# Patient Record
Sex: FEMALE | Race: WHITE | HISPANIC OR LATINO | Employment: FULL TIME | URBAN - METROPOLITAN AREA
[De-identification: names, ages, dates, MRNs, and addresses within clinical notes are randomized per-mention and may not be internally consistent; named-entity substitution may affect disease eponyms.]

---

## 2017-01-27 ENCOUNTER — OFFICE VISIT (OUTPATIENT)
Dept: OBSTETRICS AND GYNECOLOGY | Facility: CLINIC | Age: 37
End: 2017-01-27
Payer: COMMERCIAL

## 2017-01-27 VITALS
WEIGHT: 129.44 LBS | DIASTOLIC BLOOD PRESSURE: 82 MMHG | HEIGHT: 64 IN | BODY MASS INDEX: 22.1 KG/M2 | SYSTOLIC BLOOD PRESSURE: 116 MMHG

## 2017-01-27 DIAGNOSIS — Z12.4 SCREENING FOR MALIGNANT NEOPLASM OF THE CERVIX: ICD-10-CM

## 2017-01-27 DIAGNOSIS — Z01.419 ROUTINE GYNECOLOGICAL EXAMINATION: Primary | ICD-10-CM

## 2017-01-27 PROCEDURE — 99395 PREV VISIT EST AGE 18-39: CPT | Mod: S$GLB,,, | Performed by: OBSTETRICS & GYNECOLOGY

## 2017-01-27 PROCEDURE — 88175 CYTOPATH C/V AUTO FLUID REDO: CPT

## 2017-01-27 PROCEDURE — 99999 PR PBB SHADOW E&M-EST. PATIENT-LVL II: CPT | Mod: PBBFAC,,, | Performed by: OBSTETRICS & GYNECOLOGY

## 2017-01-27 NOTE — PROGRESS NOTES
"OBSTETRIC HISTORY:   OB History      Para Term  AB TAB SAB Ectopic Multiple Living    3 2 1 1 1     2         COMPREHENSIVE GYN HISTORY:  PAP History: Reports abnormal Paps. Date:. Treatment: Leep .  Infection History: Denies STDs. Denies PID.  Benign History: Denies uterine fibroids. Denies ovarian cysts. Denies endometriosis. Denies other conditions.  Cancer History: Denies cervical cancer. Denies uterine cancer or hyperplasia. Denies ovarian cancer. Denies vulvar cancer or pre-cancer. Denies vaginal cancer or precancer.  Denies breast cancer. Denies colon cancer.  Sexual Activity History: Reports being sexually active.  Menstrual History: Age of menarche: 9 years. Every 28 days, flows for days. Moderate flow.  Dysmenorrhea History: Reports severe dysmenorrhea.  Contraception History: Partner has had a vasectomy.  Had LSH      HPI:   36 y.o.  Patient's last menstrual period was 2015.   Patient is  here for her annual gynecologic exam.  She has no complaints. She denies bladder, breast and bowel complaints.    ROS:  GENERAL: Denies weight gain or weight loss. Feeling well overall.   SKIN: Denies rash or lesions.   HEAD: Denies headache.   NODES: Denies enlarged lymph nodes.   CHEST: Denies shortness of breath.   ABDOMEN: No abdominal pain, constipation, diarrhea, nausea, vomiting or rectal bleeding.   URINARY: No frequency, dysuria, hematuria, or burning on urination.  REPRODUCTIVE: See HPI.   BREASTS: The patient denies pain, lumps, or nipple discharge.   HEMATOLOGIC: No easy bruisability.   MUSCULOSKELETAL: Denies joint pain or back pain.   NEUROLOGIC: Denies weakness.   PSYCHIATRIC: Denies depression, anxiety or mood swings.    PE:   Visit Vitals    /82    Ht 5' 4" (1.626 m)    Wt 58.7 kg (129 lb 6.6 oz)    LMP 2015    BMI 22.21 kg/m2     APPEARANCE: Well nourished, well developed, in no acute distress.  NECK: Neck symmetric without  thyromegaly.  NODES: No inguinal, " cervical lymph node enlargement.  CHEST: Lungs clear to auscultation.  HEART: Regular rate and rhythm, no murmurs, rubs or gallops.  ABDOMEN: Soft. No tenderness or masses. No hernias.  BREASTS: Symmetrical, no skin changes or visible lesions. No palpable masses, nipple discharge or adenopathy bilaterally.  PELVIC:   VULVA: No lesions. Normal female genitalia.  URETHRAL MEATUS: Normal size and location, no lesions, no prolapse.  URETHRA: No masses, tenderness, prolapse or scarring.  VAGINA: Moist and well rugated, no discharge, no significant cystocele or rectocele.  CERVIX: No lesions and discharge.  UTERUS: SURGICALLY ABSENT.  ADNEXA: No masses or tenderness.    PROCEDURES:  Pap smear    Assessment:  Normal Gynecologic Exam    Plan:  Mammogram and Colonoscopy if indicated by current recommendations.  Return to clinic in one year or for any problems or complaints.    Counseling:  Patient was counseled today on A.C.S. Pap guidelines and recommendations for yearly pelvic exams and monthly self breast exams; to see her PCP for other health maintenance. Regular exercise and healthy diet.

## 2017-11-15 ENCOUNTER — PATIENT MESSAGE (OUTPATIENT)
Dept: OBSTETRICS AND GYNECOLOGY | Facility: CLINIC | Age: 37
End: 2017-11-15

## 2017-11-16 RX ORDER — CIPROFLOXACIN 500 MG/1
500 TABLET ORAL 2 TIMES DAILY
Qty: 6 TABLET | Refills: 0 | Status: SHIPPED | OUTPATIENT
Start: 2017-11-16 | End: 2017-11-19

## 2017-11-16 NOTE — TELEPHONE ENCOUNTER
Last routine GYN exam 1/27/17, pap WNL.    Please advise on mychart encounter:    Dr. Soto,   I have been having stabbing pains in my lower abdomen above my pubic bone. I have not experienced any burning while urinating or discharge,  but I did a dipstick urinalysis today at work and it showed small+ leukocytes. Last time I had a UTI, your office did not have appointments and I had to go to urgent care and it cost $75 to get a prescription. Can you send a prescription for Cipro to my pharmacy or do I have to make an appointment?     Thanks,   Leslye Witt

## 2018-02-08 ENCOUNTER — OFFICE VISIT (OUTPATIENT)
Dept: OBSTETRICS AND GYNECOLOGY | Facility: CLINIC | Age: 38
End: 2018-02-08
Payer: COMMERCIAL

## 2018-02-08 VITALS
WEIGHT: 133.63 LBS | SYSTOLIC BLOOD PRESSURE: 124 MMHG | DIASTOLIC BLOOD PRESSURE: 85 MMHG | HEIGHT: 64 IN | BODY MASS INDEX: 22.81 KG/M2

## 2018-02-08 DIAGNOSIS — Z12.31 ENCOUNTER FOR SCREENING MAMMOGRAM FOR MALIGNANT NEOPLASM OF BREAST: ICD-10-CM

## 2018-02-08 DIAGNOSIS — Z12.4 ROUTINE CERVICAL SMEAR: ICD-10-CM

## 2018-02-08 DIAGNOSIS — Z01.419 WELL WOMAN EXAM WITH ROUTINE GYNECOLOGICAL EXAM: Primary | ICD-10-CM

## 2018-02-08 PROCEDURE — 99999 PR PBB SHADOW E&M-EST. PATIENT-LVL III: CPT | Mod: PBBFAC,,, | Performed by: OBSTETRICS & GYNECOLOGY

## 2018-02-08 PROCEDURE — 88175 CYTOPATH C/V AUTO FLUID REDO: CPT

## 2018-02-08 PROCEDURE — 99395 PREV VISIT EST AGE 18-39: CPT | Mod: S$GLB,,, | Performed by: OBSTETRICS & GYNECOLOGY

## 2018-02-08 PROCEDURE — 99213 OFFICE O/P EST LOW 20 MIN: CPT | Mod: PO | Performed by: OBSTETRICS & GYNECOLOGY

## 2018-02-08 NOTE — PROGRESS NOTES
"OBSTETRIC HISTORY:   OB History      Para Term  AB Living    3 2 1 1 1 2    SAB TAB Ectopic Multiple Live Births            2         COMPREHENSIVE GYN HISTORY:  PAP History: Reports abnormal Paps. Date:. Treatment: Leep .  Infection History: Denies STDs. Denies PID.  Benign History: Denies uterine fibroids. Denies ovarian cysts. Denies endometriosis. Denies other conditions.  Cancer History: Denies cervical cancer. Denies uterine cancer or hyperplasia. Denies ovarian cancer. Denies vulvar cancer or pre-cancer. Denies vaginal cancer or precancer.  Denies breast cancer. Denies colon cancer.  Sexual Activity History: Reports being sexually active.  Menstrual History: Age of menarche: 9 years. Every 28 days, flows for days. Moderate flow.  Dysmenorrhea History: Reports severe dysmenorrhea.  Contraception History: Partner has had a vasectomy.  Had LSH      HPI:   37 y.o.  Patient's last menstrual period was 2015.   Patient is  here for her annual gynecologic exam.  She has no complaints but states occasional spotting and low back pain--had LSH so aware that this can happen.. She denies bladder, breast and bowel complaints.    ROS:  GENERAL: Denies weight gain or weight loss. Feeling well overall.   SKIN: Denies rash or lesions.   HEAD: Denies headache.   NODES: Denies enlarged lymph nodes.   CHEST: Denies shortness of breath.   ABDOMEN: No abdominal pain, constipation, diarrhea, nausea, vomiting or rectal bleeding.   URINARY: No frequency, dysuria, hematuria, or burning on urination.  REPRODUCTIVE: See HPI.   BREASTS: The patient denies pain, lumps, or nipple discharge.   HEMATOLOGIC: No easy bruisability.   MUSCULOSKELETAL: Denies joint pain or back pain.   NEUROLOGIC: Denies weakness.   PSYCHIATRIC: Denies depression, anxiety or mood swings.    PE:   /85   Ht 5' 4" (1.626 m)   Wt 60.6 kg (133 lb 9.6 oz)   LMP 2015   BMI 22.93 kg/m²   APPEARANCE: Well nourished, well developed, " in no acute distress.  NECK: Neck symmetric without  thyromegaly.  NODES: No inguinal, cervical lymph node enlargement.  CHEST: Lungs clear to auscultation.  HEART: Regular rate and rhythm, no murmurs, rubs or gallops.  ABDOMEN: Soft. No tenderness or masses. No hernias.  BREASTS: Symmetrical, no skin changes or visible lesions. No palpable masses, nipple discharge or adenopathy bilaterally.  PELVIC:   VULVA: No lesions. Normal female genitalia.  URETHRAL MEATUS: Normal size and location, no lesions, no prolapse.  URETHRA: No masses, tenderness, prolapse or scarring.  VAGINA: Moist and well rugated, no discharge, no significant cystocele or rectocele.  CERVIX: Normal Nabothian cysts and bleeding with obtaining Pap.  UTERUS: SURGICALLY ABSENT.  ADNEXA: No masses or tenderness.    PROCEDURES:  Pap smear    Assessment:  Normal Gynecologic Exam  Inflammatory Cervicitis--no treatment required    Plan:  Mammogram and Colonoscopy if indicated by current recommendations.  Return to clinic in one year or for any problems or complaints.    Counseling:  Patient was counseled today on A.C.S. Pap guidelines and recommendations for yearly pelvic exams and monthly self breast exams; to see her PCP for other health maintenance. Regular exercise and healthy diet.

## 2018-03-22 ENCOUNTER — OFFICE VISIT (OUTPATIENT)
Dept: OPTOMETRY | Facility: CLINIC | Age: 38
End: 2018-03-22
Payer: COMMERCIAL

## 2018-03-22 DIAGNOSIS — Z46.0 FITTING AND ADJUSTMENT OF SPECTACLES AND CONTACT LENSES: Primary | ICD-10-CM

## 2018-03-22 DIAGNOSIS — H52.13 MYOPIA OF BOTH EYES: Primary | ICD-10-CM

## 2018-03-22 DIAGNOSIS — H43.391 VITREOUS FLOATERS OF RIGHT EYE: ICD-10-CM

## 2018-03-22 PROCEDURE — 99999 PR PBB SHADOW E&M-EST. PATIENT-LVL II: CPT | Mod: PBBFAC,,, | Performed by: OPTOMETRIST

## 2018-03-22 PROCEDURE — 92310 CONTACT LENS FITTING OU: CPT | Mod: S$GLB,,, | Performed by: OPTOMETRIST

## 2018-03-22 PROCEDURE — 92004 COMPRE OPH EXAM NEW PT 1/>: CPT | Mod: S$GLB,,, | Performed by: OPTOMETRIST

## 2018-03-22 PROCEDURE — 92015 DETERMINE REFRACTIVE STATE: CPT | Mod: S$GLB,,, | Performed by: OPTOMETRIST

## 2018-03-22 NOTE — PROGRESS NOTES
HPI     Ms. Leslye Witt is here for her annual contact lens exam, she is   transferring care from an outside provider   Wears OASYS -2.00 Ou with no problems  Patient complains of having an increase in floaters in OD, states she has   had in the past when she had an eye virus    Patient request refraction     Contact Lens History   Lens Brand: Acuvue Oasis -2.00 OU, she is happy with comfort and vision   with lens   Pt replaces lens every 3-4 weeks   Wearing current lens for 1-2 weeks   Sleeps in lens: no     (-)drops  (-)flashes  (+)floaters  (-)diplopia    Diabetic no  No results found for: HGBA1C    OCULAR HISTORY  Last Eye Exam 1 year ago   (-)eye surgery   (+)pt could not specify what kind of virus, but report that she had a   reoccurring virus in 2012 affecting OD only     FAMILY HISTORY  (+)Glaucoma maternal grandmother       Last edited by Ari Moore, OD on 3/22/2018  7:51 AM. (History)        ROS     Negative for: Constitutional, Gastrointestinal, Neurological, Skin,   Genitourinary, Musculoskeletal, HENT, Endocrine, Cardiovascular, Eyes,   Respiratory, Psychiatric, Allergic/Imm, Heme/Lymph    Last edited by Ari Moore, OD on 3/22/2018  7:51 AM. (History)        Assessment /Plan     For exam results, see Encounter Report.    Myopia of both eyes    Vitreous floaters of right eye      1. Good fit/VA w OASYS CLs--pt happy  2. Discussed impending presby  3. Vitreous floaters--discussed Signs/Symptoms of Retinal Detachment.  Pt to rtc immediately if increased Floaters/Flashes occur    PLAN:    1. Wrote spex/CLRx  2. Continue Daily Wear schedule.  NO SLEEPING IN CONTACT LENSES. Clean and disinfect nightly.  exchange monthly.    3. rtc 1 yr

## 2018-04-05 ENCOUNTER — LAB VISIT (OUTPATIENT)
Dept: LAB | Facility: HOSPITAL | Age: 38
End: 2018-04-05
Attending: FAMILY MEDICINE
Payer: COMMERCIAL

## 2018-04-05 ENCOUNTER — OFFICE VISIT (OUTPATIENT)
Dept: FAMILY MEDICINE | Facility: CLINIC | Age: 38
End: 2018-04-05
Payer: COMMERCIAL

## 2018-04-05 VITALS
TEMPERATURE: 99 F | OXYGEN SATURATION: 100 % | WEIGHT: 125.88 LBS | HEART RATE: 76 BPM | BODY MASS INDEX: 20.97 KG/M2 | DIASTOLIC BLOOD PRESSURE: 60 MMHG | HEIGHT: 65 IN | SYSTOLIC BLOOD PRESSURE: 110 MMHG

## 2018-04-05 DIAGNOSIS — Z00.00 ANNUAL PHYSICAL EXAM: ICD-10-CM

## 2018-04-05 DIAGNOSIS — G89.29 CHRONIC RIGHT SHOULDER PAIN: ICD-10-CM

## 2018-04-05 DIAGNOSIS — M25.511 CHRONIC RIGHT SHOULDER PAIN: ICD-10-CM

## 2018-04-05 DIAGNOSIS — Z00.00 ANNUAL PHYSICAL EXAM: Primary | ICD-10-CM

## 2018-04-05 LAB
ALBUMIN SERPL BCP-MCNC: 4.3 G/DL
ALP SERPL-CCNC: 56 U/L
ALT SERPL W/O P-5'-P-CCNC: 22 U/L
ANION GAP SERPL CALC-SCNC: 6 MMOL/L
AST SERPL-CCNC: 24 U/L
BASOPHILS # BLD AUTO: 0.02 K/UL
BASOPHILS NFR BLD: 0.4 %
BILIRUB SERPL-MCNC: 0.7 MG/DL
BUN SERPL-MCNC: 14 MG/DL
CALCIUM SERPL-MCNC: 10 MG/DL
CHLORIDE SERPL-SCNC: 106 MMOL/L
CHOLEST SERPL-MCNC: 194 MG/DL
CHOLEST/HDLC SERPL: 2.3 {RATIO}
CO2 SERPL-SCNC: 28 MMOL/L
CREAT SERPL-MCNC: 0.8 MG/DL
DIFFERENTIAL METHOD: NORMAL
EOSINOPHIL # BLD AUTO: 0 K/UL
EOSINOPHIL NFR BLD: 0.9 %
ERYTHROCYTE [DISTWIDTH] IN BLOOD BY AUTOMATED COUNT: 12.9 %
EST. GFR  (AFRICAN AMERICAN): >60 ML/MIN/1.73 M^2
EST. GFR  (NON AFRICAN AMERICAN): >60 ML/MIN/1.73 M^2
ESTIMATED AVG GLUCOSE: 97 MG/DL
GLUCOSE SERPL-MCNC: 88 MG/DL
HBA1C MFR BLD HPLC: 5 %
HCT VFR BLD AUTO: 46 %
HDLC SERPL-MCNC: 85 MG/DL
HDLC SERPL: 43.8 %
HGB BLD-MCNC: 15 G/DL
LDLC SERPL CALC-MCNC: 96.6 MG/DL
LYMPHOCYTES # BLD AUTO: 1.2 K/UL
LYMPHOCYTES NFR BLD: 26.7 %
MCH RBC QN AUTO: 30.6 PG
MCHC RBC AUTO-ENTMCNC: 32.6 G/DL
MCV RBC AUTO: 94 FL
MONOCYTES # BLD AUTO: 0.4 K/UL
MONOCYTES NFR BLD: 8.2 %
NEUTROPHILS # BLD AUTO: 2.9 K/UL
NEUTROPHILS NFR BLD: 63.8 %
NONHDLC SERPL-MCNC: 109 MG/DL
PLATELET # BLD AUTO: 178 K/UL
PMV BLD AUTO: 11 FL
POTASSIUM SERPL-SCNC: 4.8 MMOL/L
PROT SERPL-MCNC: 7.3 G/DL
RBC # BLD AUTO: 4.9 M/UL
SODIUM SERPL-SCNC: 140 MMOL/L
TRIGL SERPL-MCNC: 62 MG/DL
TSH SERPL DL<=0.005 MIU/L-ACNC: 1.09 UIU/ML
URATE SERPL-MCNC: 4.5 MG/DL
WBC # BLD AUTO: 4.53 K/UL

## 2018-04-05 PROCEDURE — 99999 PR PBB SHADOW E&M-EST. PATIENT-LVL III: CPT | Mod: PBBFAC,,, | Performed by: FAMILY MEDICINE

## 2018-04-05 PROCEDURE — 80061 LIPID PANEL: CPT

## 2018-04-05 PROCEDURE — 36415 COLL VENOUS BLD VENIPUNCTURE: CPT | Mod: PO

## 2018-04-05 PROCEDURE — 84550 ASSAY OF BLOOD/URIC ACID: CPT

## 2018-04-05 PROCEDURE — 99385 PREV VISIT NEW AGE 18-39: CPT | Mod: S$GLB,,, | Performed by: FAMILY MEDICINE

## 2018-04-05 PROCEDURE — 83036 HEMOGLOBIN GLYCOSYLATED A1C: CPT

## 2018-04-05 PROCEDURE — 80053 COMPREHEN METABOLIC PANEL: CPT

## 2018-04-05 PROCEDURE — 84443 ASSAY THYROID STIM HORMONE: CPT

## 2018-04-05 PROCEDURE — 85025 COMPLETE CBC W/AUTO DIFF WBC: CPT

## 2018-04-05 NOTE — PROGRESS NOTES
"Chief Complaint   Patient presents with    [facility]     SUBJECTIVE:   37 y.o. female for annual routine checkup.  Current Outpatient Prescriptions   Medication Sig Dispense Refill    CALCIUM CARBONATE (CALCIUM 500 ORAL) Take 500 mg by mouth once daily.       cetirizine (ZYRTEC) 10 MG tablet Take 10 mg by mouth once daily.        multivitamin capsule Take 1 capsule by mouth once daily.         No current facility-administered medications for this visit.      Allergies: Patient has no known allergies.   Patient's last menstrual period was 09/03/2015.    ROS:  Feeling well. No dyspnea or chest pain on exertion.  No abdominal pain, change in bowel habits, black or bloody stools.  No urinary tract symptoms. GYN ROS: no breast pain or new or enlarging lumps on self exam, no vaginal bleeding. No neurological complaints.  Answers for HPI/ROS submitted by the patient on 4/5/2018   activity change: No  unexpected weight change: No  neck pain: No  hearing loss: No  rhinorrhea: No  trouble swallowing: No  eye discharge: No  visual disturbance: No  chest tightness: No  wheezing: No  chest pain: No  palpitations: No  blood in stool: No  constipation: No  vomiting: No  diarrhea: No  polydipsia: No  polyuria: No  difficulty urinating: No  hematuria: No  menstrual problem: No  dysuria: No  joint swelling: No  arthralgias: No  headaches: Yes  weakness: No  confusion: No  dysphoric mood: No    OBJECTIVE:   The patient appears well, alert, oriented x 3, in no distress.  /60   Pulse 76   Temp 99.3 °F (37.4 °C) (Oral)   Ht 5' 5" (1.651 m)   Wt 57.1 kg (125 lb 14.1 oz)   LMP 09/03/2015   SpO2 100%   BMI 20.95 kg/m²     Screening exam is normal  BREAST EXAM: deferred    PELVIC EXAM: deferred    ASSESSMENT:   1. Annual physical exam          PLAN:   Leslye was seen today for [facility].    Diagnoses and all orders for this visit:    Annual physical exam  -     CBC auto differential; Standing  -     Comprehensive " metabolic panel; Standing  -     Hemoglobin A1c; Standing  -     Lipid panel; Standing  -     TSH; Standing  -     Uric acid; Future  -     Urinalysis; Standing  Counseled on age appropriate medical preventative services, including age appropriate cancer screenings, over all nutritional health, need for a consistent exercise regimen and an over all push towards maintaining a vigorous and active lifestyle.  Counseled on age appropriate vaccines and discussed upcoming health care needs based on age/gender.  Spent time with patient counseling on need for a good patient/doctor relationship moving forward.  Discussed use of common OTC medications and supplements.  Discussed common dietary aids and use of caffeine and the need for good sleep hygiene and stress management.

## 2018-05-31 ENCOUNTER — HOSPITAL ENCOUNTER (OUTPATIENT)
Dept: RADIOLOGY | Facility: HOSPITAL | Age: 38
Discharge: HOME | End: 2018-05-31
Attending: OBSTETRICS & GYNECOLOGY
Payer: COMMERCIAL

## 2018-05-31 VITALS — BODY MASS INDEX: 20.83 KG/M2 | HEIGHT: 65 IN | WEIGHT: 125 LBS

## 2018-05-31 DIAGNOSIS — Z01.419 WELL WOMAN EXAM WITH ROUTINE GYNECOLOGICAL EXAM: ICD-10-CM

## 2018-05-31 DIAGNOSIS — Z12.31 ENCOUNTER FOR SCREENING MAMMOGRAM FOR MALIGNANT NEOPLASM OF BREAST: ICD-10-CM

## 2018-05-31 PROCEDURE — 77063 BREAST TOMOSYNTHESIS BI: CPT | Mod: 26,,, | Performed by: RADIOLOGY

## 2018-05-31 PROCEDURE — 77067 SCR MAMMO BI INCL CAD: CPT | Mod: 26,,, | Performed by: RADIOLOGY

## 2018-05-31 PROCEDURE — 77067 SCR MAMMO BI INCL CAD: CPT | Mod: TC

## 2018-08-28 ENCOUNTER — PATIENT MESSAGE (OUTPATIENT)
Dept: FAMILY MEDICINE | Facility: CLINIC | Age: 38
End: 2018-08-28

## 2018-08-28 ENCOUNTER — LAB VISIT (OUTPATIENT)
Dept: LAB | Facility: HOSPITAL | Age: 38
End: 2018-08-28
Attending: FAMILY MEDICINE
Payer: COMMERCIAL

## 2018-08-28 ENCOUNTER — OFFICE VISIT (OUTPATIENT)
Dept: FAMILY MEDICINE | Facility: CLINIC | Age: 38
End: 2018-08-28
Payer: COMMERCIAL

## 2018-08-28 VITALS
RESPIRATION RATE: 16 BRPM | DIASTOLIC BLOOD PRESSURE: 86 MMHG | WEIGHT: 138.25 LBS | BODY MASS INDEX: 23.03 KG/M2 | TEMPERATURE: 99 F | HEIGHT: 65 IN | SYSTOLIC BLOOD PRESSURE: 120 MMHG | OXYGEN SATURATION: 98 % | HEART RATE: 86 BPM

## 2018-08-28 DIAGNOSIS — T78.40XA ALLERGIC REACTION, INITIAL ENCOUNTER: ICD-10-CM

## 2018-08-28 DIAGNOSIS — T78.40XA ALLERGIC REACTION, INITIAL ENCOUNTER: Primary | ICD-10-CM

## 2018-08-28 PROCEDURE — 86003 ALLG SPEC IGE CRUDE XTRC EA: CPT | Mod: 59

## 2018-08-28 PROCEDURE — 86003 ALLG SPEC IGE CRUDE XTRC EA: CPT

## 2018-08-28 PROCEDURE — 36415 COLL VENOUS BLD VENIPUNCTURE: CPT | Mod: PO

## 2018-08-28 PROCEDURE — 96372 THER/PROPH/DIAG INJ SC/IM: CPT | Mod: S$GLB,,, | Performed by: PHYSICIAN ASSISTANT

## 2018-08-28 PROCEDURE — 99214 OFFICE O/P EST MOD 30 MIN: CPT | Mod: 25,S$GLB,, | Performed by: PHYSICIAN ASSISTANT

## 2018-08-28 PROCEDURE — 3008F BODY MASS INDEX DOCD: CPT | Mod: CPTII,S$GLB,, | Performed by: PHYSICIAN ASSISTANT

## 2018-08-28 PROCEDURE — 99999 PR PBB SHADOW E&M-EST. PATIENT-LVL V: CPT | Mod: PBBFAC,,, | Performed by: PHYSICIAN ASSISTANT

## 2018-08-28 RX ORDER — METHYLPREDNISOLONE ACETATE 80 MG/ML
80 INJECTION, SUSPENSION INTRA-ARTICULAR; INTRALESIONAL; INTRAMUSCULAR; SOFT TISSUE ONCE
Status: COMPLETED | OUTPATIENT
Start: 2018-08-28 | End: 2018-08-28

## 2018-08-28 RX ORDER — EPINEPHRINE 0.3 MG/.3ML
1 INJECTION SUBCUTANEOUS ONCE
Qty: 2 EACH | Refills: 0 | Status: SHIPPED | OUTPATIENT
Start: 2018-08-28 | End: 2018-08-28

## 2018-08-28 RX ORDER — PREDNISONE 20 MG/1
20 TABLET ORAL DAILY
Qty: 20 TABLET | Refills: 0 | Status: SHIPPED | OUTPATIENT
Start: 2018-08-28 | End: 2018-09-07

## 2018-08-28 RX ORDER — HYDROXYZINE HYDROCHLORIDE 25 MG/1
25 TABLET, FILM COATED ORAL NIGHTLY
Qty: 30 TABLET | Refills: 0 | Status: SHIPPED | OUTPATIENT
Start: 2018-08-28 | End: 2019-01-03

## 2018-08-28 RX ORDER — CHOLECALCIFEROL (VITAMIN D3) 25 MCG
1000 TABLET ORAL DAILY
COMMUNITY

## 2018-08-28 RX ADMIN — METHYLPREDNISOLONE ACETATE 80 MG: 80 INJECTION, SUSPENSION INTRA-ARTICULAR; INTRALESIONAL; INTRAMUSCULAR; SOFT TISSUE at 08:08

## 2018-08-28 NOTE — PROGRESS NOTES
Pt tolerated injection, given information sheet verbalized understanding and instructed to wait 15 minutes post injection.

## 2018-08-28 NOTE — PATIENT INSTRUCTIONS
General Allergic Reactions  An allergic reaction is a set of symptoms caused by an allergen. An allergen is something that causes a persons immune system to react. When a person comes in contact with an allergen, it causes the body to release chemicals. These include the chemical histamine. Histamine causes swelling and itching. It may affect the entire body. This is called a general allergic reaction. Often symptoms affect only 1 part of the body. This is called a local allergic reaction.  You are having an allergic reaction. Almost anything can cause one. Different people are allergic to different things. It is usually something that you ate or swallowed, came into contact with by getting or putting it on your skin or clothes, or something you breathed in the air. This can be very annoying and sometimes scary.  Most of us think of allergic reactions when we have a rash or itchy skin. Symptoms can include:  · Itching of the eyes, nose, and roof of the mouth  · Runny or stuffy nose  · Watery eyes   · Sneezing or coughing   · A blocked feeling in the ear  · Red, itchy rash called hives  · Red and purple spots  · Rash, redness, welts, blisters  · Itching, burning, stinging, pain  · Dry, flaky, cracking, scaly skin  Severe symptoms include:  · Swelling of the face, lips, or other parts of the body  · Hoarse voice  · Trouble swallowing, feeling like your throat is closing  · Trouble breathing, wheezing  · Nausea, vomiting, diarrhea, stomach cramps  · Feeling faint or lightheaded, rapid heart rate  Sometimes the cause may be obvious. But there are so many things that can cause a reaction that you may not be able to figure out. The most important things to help find your allergen are:  · Remembering when it started  · What you were doing at the time or just before that  · Any activities you were involved in  · Any new products or contacts  Below are some common causes. But remember that almost anything can cause a  reaction. You may not even be aware that you came into contact with one of these things:  · Dust, mold, pollen  · Plants (common ones are poison ivy and poison oak, but there are many others)   · Animals  · Foods such as shrimp, shellfish, peanuts, milk products, gluten, and eggs. Also food colorings, flavorings, and additives.  · Insect bites or stings such as bees, mosquitos, fleas, ticks  · Medicines such as penicillin, sulfa medicines, amoxicillin, aspirin, and ibuprofen. But any medicine can cause a reaction.  · Jewelry such as nickel or gold. This can be new, or something youve worn for a while, including zippers and buttons.  · Latex such as in gloves, clothes, toys, balloons, or some tapes. Some people allergic to latex may also have problems with foods like bananas, avocados, kiwi, papaya, or chestnuts.  · Lotions, perfumes, cosmetics, soaps, shampoos, skincare products, nail products  · Chemicals or dyes in clothing, linen, , hair dyes, soaps, iodine  Many viruses and common colds can cause a rash that is not an allergic reaction. Sometimes it is hard to tell the difference between allergies, sensitivity, or an intolerance to something. This is especially true with food. Many things can cause diarrhea, vomiting, stomach cramps, and skin irritation.  Home care    The goal of treatment is to help relieve the symptoms and get you feeling better. The rash will usually fade over several days. But it can sometimes last a couple of weeks. Over the next couple of days, there may be times when it is gets a little worse, and then better again. Here are some things to do:  · If you know what you are allergic to, stay away from it. Future reactions could be worse than this one.  · Avoid tight clothing and anything that heats up your skin (hot showers or baths, direct sunlight). Heat will make itching worse.  · An ice pack will relieve local areas of intense itching and redness. To make an ice pack, put ice  cubes in a plastic bag that seals at the top. Wrap it in a thin, clean towel. Dont put the ice directly on the skin because it can damage the skin.  · Oral diphenhydramine is an over-the-counter antihistamine sold at pharmacy and grocery stores. Unless a prescription antihistamine was given, diphenhydramine may be used to reduce itching if large areas of the skin are involved. It may make you sleepy. So be careful using it in the daytime or when going to school, working, or driving. Note: Dont use diphenhydramine if you have glaucoma or if you are a man with trouble urinating due to an enlarged prostate. There are other antihistamines that wont make you so sleepy. These are good choices for daytime use. Ask your pharmacist for suggestions.  · Dont use diphenhydramine cream on your skin. It can cause a further reaction in some people.  · To help prevent an infection, don't scratch the affected area. Scratching may worsen the reaction and damage your skin. It can also lead to an infection. Always check the affected for signs of an infection.  · Call your healthcare provider and ask what you can use to help decrease the itching.  · To decrease allergic reactions, try the following:    · Use heat-steam to clean your home  · Use high-efficiency particulate (HEPA) vacuums and filters  · Stay away from food and pet triggers  · Kill any cockroaches  · Clean your house often  Follow-up care  Follow up with your healthcare provider, or as advised. If you had a severe reaction today, or if you have had several mild to medium allergic reactions in the past, ask your provider about allergy testing. This can help you find out what you are allergic to. If your reaction included dizziness, fainting, or trouble breathing or swallowing, ask your provider about carrying auto-injectable epinephrine.  Call 911  Call 911 if any of these occur:  · Trouble breathing or swallowing, wheezing  · Cool, moist, pale skin  · Shortness of  breath  · Hoarse voice or trouble speaking  · Confused   · Very drowsy or trouble awakening  · Fainting or loss of consciousness  · Rapid heart rate  · Feeling of dizziness or weakness or a sudden drop in blood pressure  · Feeling of doom  · Feeling lightheaded  · Severe nausea or vomiting, or diarrhea  · Seizure  · Swelling in the face, eyelids, lips, mouth, throat or tongue  · Drooling  When to seek medical advice  Call your healthcare provider right away if any of these occur:  · Spreading areas of itching, redness or swelling  · Nausea or stomach cramps or abdominal pain  · Continuing or recurring symptoms  · Spreading areas of redness, swelling, or itching  · Signs of infection at the affected site:  ¨ Spreading redness  ¨ Increased pain or swelling  ¨ Fluid or colored drainage from the site  ¨ Fever of 100.4°F (38°C) or above lasting for 24 to 48 hours, or as directed by your provider  Date Last Reviewed: 3/1/2017  © 6621-5394 [employer]. [address] [place] [address], [place] [id number]. All rights reserved. This information is not intended as a substitute for professional medical care. Always follow your healthcare professional's instructions.      Food diary:  Eliminate almonds, eggs, and shellfish  Once resolved may add 1 item and give 3 days to react   [phone] Referrals

## 2018-08-28 NOTE — PROGRESS NOTES
Answers for HPI/ROS submitted by the patient on 8/28/2018   Rash  Chronicity: new  Onset: in the past 7 days  Progression since onset: gradually worsening  Affected locations: head, face, neck, chest, torso, back, abdomen, groin, left ear, left shoulder, left axilla, left arm, left elbow, left hand, left wrist, right ear, right shoulder, right axilla, right arm, right elbow, right hand, right wrist  Characteristics: burning, redness, itchiness  Exposed to: unknown  anorexia: No  congestion: No  cough: No  diarrhea: No  eye pain: No  facial edema: Yes  fatigue: Yes  fever: No  joint pain: No  nail changes: No  rhinorrhea: No  shortness of breath: No  sore throat: No  vomiting: No  Treatments tried: anti-itch cream, antihistamine, cold compress, topical steroids, oral steroids  Improvement on treatment: mild  asthma: No  allergies: Yes  eczema: No  varicella: Yes

## 2018-08-29 ENCOUNTER — LAB VISIT (OUTPATIENT)
Dept: LAB | Facility: HOSPITAL | Age: 38
End: 2018-08-29
Attending: PHYSICIAN ASSISTANT
Payer: COMMERCIAL

## 2018-08-29 DIAGNOSIS — L50.9 URTICARIA: Primary | ICD-10-CM

## 2018-08-29 DIAGNOSIS — L50.9 URTICARIA: ICD-10-CM

## 2018-08-29 LAB
ALMOND IGE QN: <0.35 KU/L
CRAB IGE QN: <0.35 KU/L
DEPRECATED ALMOND IGE RAST QL: NORMAL
DEPRECATED CRAB IGE RAST QL: NORMAL
DEPRECATED EGG WHITE IGE RAST QL: NORMAL
DEPRECATED EGG YOLK IGE RAST QL: NORMAL
EGG WHITE IGE QN: <0.35 KU/L
EGG YOLK IGE/IGE TOTAL %: <0.35 KU/L
T4 FREE SERPL-MCNC: 1.12 NG/DL
TSH SERPL DL<=0.005 MIU/L-ACNC: 0.58 UIU/ML

## 2018-08-29 PROCEDURE — 36415 COLL VENOUS BLD VENIPUNCTURE: CPT

## 2018-08-29 PROCEDURE — 86800 THYROGLOBULIN ANTIBODY: CPT

## 2018-08-29 PROCEDURE — 84443 ASSAY THYROID STIM HORMONE: CPT

## 2018-08-29 PROCEDURE — 84480 ASSAY TRIIODOTHYRONINE (T3): CPT

## 2018-08-29 PROCEDURE — 84439 ASSAY OF FREE THYROXINE: CPT

## 2018-08-29 PROCEDURE — 86376 MICROSOMAL ANTIBODY EACH: CPT

## 2018-08-29 PROCEDURE — 84445 ASSAY OF TSI GLOBULIN: CPT

## 2018-08-30 LAB
T3 SERPL-MCNC: 84 NG/DL
THYROPEROXIDASE IGG SERPL-ACNC: <6 IU/ML

## 2018-08-31 LAB
THRYOGLOBULIN INTERPRETATION: ABNORMAL
THYROGLOB AB SERPL-ACNC: <1.8 IU/ML
THYROGLOB SERPL-MCNC: 17 NG/ML
TSI SER-ACNC: <0.1 IU/L

## 2018-09-05 ENCOUNTER — PATIENT MESSAGE (OUTPATIENT)
Dept: FAMILY MEDICINE | Facility: CLINIC | Age: 38
End: 2018-09-05

## 2018-09-24 ENCOUNTER — HOSPITAL ENCOUNTER (OUTPATIENT)
Dept: RADIOLOGY | Facility: HOSPITAL | Age: 38
Discharge: HOME | End: 2018-09-24
Attending: STUDENT IN AN ORGANIZED HEALTH CARE EDUCATION/TRAINING PROGRAM
Payer: COMMERCIAL

## 2018-09-24 ENCOUNTER — OFFICE VISIT (OUTPATIENT)
Dept: ALLERGY | Facility: CLINIC | Age: 38
End: 2018-09-24
Payer: COMMERCIAL

## 2018-09-24 DIAGNOSIS — E04.9 ENLARGED THYROID GLAND: ICD-10-CM

## 2018-09-24 DIAGNOSIS — L50.9 URTICARIA: ICD-10-CM

## 2018-09-24 DIAGNOSIS — J31.0 RHINITIS, UNSPECIFIED TYPE: ICD-10-CM

## 2018-09-24 DIAGNOSIS — R89.9 ABNORMAL LABORATORY TEST RESULT: ICD-10-CM

## 2018-09-24 DIAGNOSIS — T78.3XXD ANGIOEDEMA, SUBSEQUENT ENCOUNTER: ICD-10-CM

## 2018-09-24 DIAGNOSIS — E04.9 ENLARGED THYROID GLAND: Primary | ICD-10-CM

## 2018-09-24 PROCEDURE — 76536 US EXAM OF HEAD AND NECK: CPT | Mod: 26,,, | Performed by: RADIOLOGY

## 2018-09-24 PROCEDURE — 76536 US EXAM OF HEAD AND NECK: CPT | Mod: TC

## 2018-09-24 PROCEDURE — 99999 PR PBB SHADOW E&M-EST. PATIENT-LVL III: CPT | Mod: PBBFAC,,, | Performed by: STUDENT IN AN ORGANIZED HEALTH CARE EDUCATION/TRAINING PROGRAM

## 2018-09-24 PROCEDURE — 99243 OFF/OP CNSLTJ NEW/EST LOW 30: CPT | Mod: S$GLB,,, | Performed by: STUDENT IN AN ORGANIZED HEALTH CARE EDUCATION/TRAINING PROGRAM

## 2018-09-24 NOTE — PATIENT INSTRUCTIONS
Testing  Ultrasound of thyrroid    Blood work for allergy testing and additional thyroid testing today    Keep a hive/swelling diary if Sx recur    Treatment    If hives or itching recur:  Zyrtec 10mg BID  Zantac 150 mg BID  Atarax at bedtime  OK to take 1-2 additional Zyrtec prn      Return after testing

## 2018-09-24 NOTE — LETTER
September 24, 2018      NANCY Guthrie  [address] [address]  [place] [address]           [facility]  [address]  [facility] [address]  Phone: [phone]  Fax: [phone]          Patient: Leslye Witt   MR Number: [id number]   YOB: 1980   Date of Visit: 9/24/2018       Dear Allison Munson:    Thank you for referring Leslye Witt to me for evaluation. Attached you will find relevant portions of my assessment and plan of care.    If you have questions, please do not hesitate to call me. I look forward to following Leslye Witt along with you.    Sincerely,    Sloane Bush MD    Enclosure  CC:  No Recipients    If you would like to receive this communication electronically, please contact [email or web address] or [phone] to request more information on [employer] access.    For providers and/or their staff who would like to refer a patient to [facility], please contact us through our one-stop-shop provider referral line, [facility] , at [phone].    If you feel you have received this communication in error or would no longer like to receive these types of communications, please e-mail [email or web address]

## 2018-09-24 NOTE — PROGRESS NOTES
Allergy Clinic Note  [facility]    Subjective:      Patient ID: Leslye Witt is a 38 y.o. female.    Chief Complaint: No chief complaint on file.      Referring Provider: Allison Munson    History of Present Illness:  38-year-old female referred from primary care for evaluation of suspected systemic allergic skin reaction in late August 2018, now resolved    She reports 2 completely different types of rashes.  Symptom onset was during a beach trip.  After spending the night in the beach house but before any sun exposure, she developed a rash on her neck which was itchy but resolved in a few hours.  The following day at returned on her neck and also on her chest with patches and itching described as like fire.  By Sunday night the rash was diffuse.  On Monday morning she started Medrol Dosepak prescribed by her PCP by phone.     By Monday afternoon the 2nd type of rash had started. She had itchy dots on her face which she described as connecting and welts.  These extended to include her arms, legs, scalp, and face.  She says she also felt throat symptom like pill stuck in throat.  She denies any shortness of breath, chest symptoms or GI symptoms. The following day she was seen at her PCPs office which she was given Celestone and a prednisone taper as well as EpiPen and H1 and H2 antihistamines.  She was also to told to eliminate eggs, dairy and shellfish.  Her symptoms recurred the following day with swelling of the ears lips and eyelids.  She was told that both her food allergy tests and her thyroid tests were normal.  At this point she had persistent itching but no hives.  She was able to reintroduce the foods and other products without difficulty.  Total duration of hives and swelling was about 48 hr.    In retrospect she noted that the house they were staying in had oxy clean which to which she had had a rash more than 15 years ago.  There was also a eucalyptus shower spray which was done  familiar.    Other allergic syndromes include seasonal rhinitis, primarily in late spring and late summer (including currently).  She was on Zyrtec at the outside of all symptoms.    Since the onset of symptoms she...  Denies fever, shakes, or chills  Denies change in weight, appetite, or energy level  Denies change in the texture of her hair, skin, or nails  Denies change in the appearance of her urine or stool  Denies vomiting, diarrhea, constipation, or abdominal pain  Denies abnormal bleeding  Denies any new aches or pains, specifically myalgias or arthralgias   Denies any unusual moles      Additional History:   Past medical history is significant for  an abnormal Pap smear status post LEEP.  She has no history of thyroid disease..  No Hx of ENT surgery. Family history is allergies or thyroid disease. Client   reports that  has never smoked. she has never used smokeless tobacco.  Exposures are notable for 2 dogs and staying at a beach house for the 1st few days of her rash.  No exposure to smokers, mold, or on unusual substances.  She works as a nurse at [employer]    Patient Active Problem List   Diagnosis    Personal history of cervical dysplasia    S/P laparoscopic hysterectomy    Chronic right shoulder pain, has rotator cuff tear     Current Outpatient Medications on File Prior to Visit   Medication Sig Dispense Refill    CALCIUM CARBONATE (CALCIUM 500 ORAL) Take 500 mg by mouth once daily.       cetirizine (ZYRTEC) 10 MG tablet Take 10 mg by mouth once daily.        EPINEPHrine (EPIPEN) 0.3 mg/0.3 mL AtIn Inject 0.3 mLs (0.3 mg total) into the muscle once. for 1 dose 2 each 0    hydrOXYzine HCl (ATARAX) 25 MG tablet Take 1 tablet (25 mg total) by mouth every evening. 30 tablet 0    multivitamin capsule Take 1 capsule by mouth once daily.        ranitidine (ZANTAC) 150 MG tablet Take 1 tablet (150 mg total) by mouth 2 (two) times daily. 60 tablet 11    vitamin D 1000 units Tab Take 1,000 Units  by mouth once daily.       No current facility-administered medications on file prior to visit.          Review of Systems   Constitutional: Negative for chills, fever and malaise/fatigue.   HENT: Negative for ear discharge.    Eyes: Negative for pain and discharge.   Respiratory: Negative for hemoptysis, shortness of breath, wheezing and stridor.    Cardiovascular: Negative for chest pain and palpitations.   Gastrointestinal: Negative for abdominal pain, blood in stool, nausea and vomiting.   Genitourinary: Negative for dysuria, flank pain and hematuria.   Musculoskeletal: Negative for joint pain and myalgias.   Skin: Positive for itching and rash.   Neurological: Negative for seizures and loss of consciousness.       Objective:   LMP 09/03/2015       Physical Exam   Constitutional: She is oriented to person, place, and time and well-developed, well-nourished, and in no distress. No distress.   HENT:   Right Ear: Tympanic membrane normal.   Left Ear: Tympanic membrane normal.   Nose: No nasal deformity. No epistaxis.  No foreign bodies.   Mouth/Throat: No uvula swelling. No oropharyngeal exudate or tonsillar abscesses.   TMs clear.  Nares pink without significant turbinates swelling   Eyes: Conjunctivae are normal. Pupils are equal, round, and reactive to light. Right eye exhibits no discharge. Left eye exhibits no discharge.   Neck: Neck supple. Thyromegaly present.   Symmetrically enlarged, firm thyroid without obvious nodularity.   Cardiovascular: Normal rate, regular rhythm, normal heart sounds and intact distal pulses.   Pulmonary/Chest: Breath sounds normal. No stridor. No respiratory distress. She has no wheezes. She has no rales.   Abdominal: Soft. She exhibits no mass. There is no tenderness.   Liver approximately 14 cm by scratch   Musculoskeletal: She exhibits no edema or deformity.   Lymphadenopathy:     She has no cervical adenopathy.   Neurological: She is alert and oriented to person, place, and  time. Gait normal.   Skin: Skin is warm and dry. No rash noted.   Psychiatric: Affect and judgment normal.       Data:   Labs from PCP (08/28/2018):  Thyroglobulin tumor marker 17 (High)  Thyroglobulin antibody screen less than 1.8  TSH 0.58  Total T3 84  Free T4 1.12  Thyroid peroxidase antibodies less than 6  Thyroid stimulating immunoglobulin quantitative less than 1.0    Food testing by the serum Immunocap method (08/28/2018 were negative for all allergens tested, specifically on meds, crab, egg white, egg yolk.    Assessment:     1. Enlarged thyroid gland    2. Abnormal laboratory test result    3. Urticaria    4. Rhinitis, unspecified type    5. Angioedema, subsequent encounter        Plan:       Medical decision making:  Client is presenting with an isolated episode of acute urticaria and angioedema (with minimal laryngeal symptoms). The 1st rash has a wide differential including contact and irritant dermatitis where as the 2nd rash which is clearly urticaria/angioedema. Symptoms have now resolved on daily low-dose histamine 1 blockers.       The best clue as to etiology is her thyromegaly, although she has no symptoms of hyper or hypothyroidism and has not normal TSH and free T4 within the last month.  The significance of her high thyroglobulin tumor marker is unknown.  Client also appears to be atopic with her seasonal rhinitis.          Diagnoses and all orders for this visit:    Enlarged thyroid gland  -     US Soft Tissue Head Neck Thyroid; Future    Abnormal laboratory test result  -     US Soft Tissue Head Neck Thyroid; Future    Urticaria  -     Anti-thyroglobulin Antibody Level; Future  -     Antimicrosomal Antibody Level; Future  -     Anti-IgE Receptor Antibody Level; Future  -     Serum Tryptase; Future    Rhinitis, unspecified type  -     IgE; Future  -     Dermatophagoides [place]; Future  -     Dermatophagoides Pteronyssinus; Future  -     Bermuda; Future  -     Francesco; Future  -     Glascock;  Future  -     English Plantain; Future  -     Oak Pecan; Future  -     Pecan; Future  -     Marsh Elder; Future  -     Ragweed; Future  -     Alternaria; Future  -     Aspergillus; Future  -     Cat; Future  -     Cockroach; Future  -     Dog; Future    Angioedema, subsequent encounter        Patient Instructions   Testing  Ultrasound of thyrroid    Blood work for allergy testing and additional thyroid testing today    Keep a hive/swelling diary if Sx recur    Treatment    If hives or itching recur:  Zyrtec 10mg BID  Zantac 150 mg BID  Atarax at bedtime  OK to take 1-2 additional Zyrtec prn      Return after testing          Follow-up in about 2 weeks (around 10/8/2018).    Sloane Bush MD

## 2018-10-01 ENCOUNTER — OFFICE VISIT (OUTPATIENT)
Dept: ALLERGY | Facility: CLINIC | Age: 38
End: 2018-10-01
Payer: COMMERCIAL

## 2018-10-01 VITALS
HEIGHT: 64 IN | DIASTOLIC BLOOD PRESSURE: 76 MMHG | BODY MASS INDEX: 22.43 KG/M2 | WEIGHT: 131.38 LBS | SYSTOLIC BLOOD PRESSURE: 108 MMHG

## 2018-10-01 DIAGNOSIS — Z87.898 HISTORY OF ANGIOEDEMA: ICD-10-CM

## 2018-10-01 DIAGNOSIS — Z87.2 HX OF URTICARIA: Primary | ICD-10-CM

## 2018-10-01 PROCEDURE — 99999 PR PBB SHADOW E&M-EST. PATIENT-LVL III: CPT | Mod: PBBFAC,,, | Performed by: STUDENT IN AN ORGANIZED HEALTH CARE EDUCATION/TRAINING PROGRAM

## 2018-10-01 PROCEDURE — 99213 OFFICE O/P EST LOW 20 MIN: CPT | Mod: S$GLB,,, | Performed by: STUDENT IN AN ORGANIZED HEALTH CARE EDUCATION/TRAINING PROGRAM

## 2018-10-01 PROCEDURE — 3008F BODY MASS INDEX DOCD: CPT | Mod: CPTII,S$GLB,, | Performed by: STUDENT IN AN ORGANIZED HEALTH CARE EDUCATION/TRAINING PROGRAM

## 2018-10-01 RX ORDER — EPINEPHRINE 0.3 MG/.3ML
INJECTION INTRAMUSCULAR
Refills: 0 | COMMUNITY
Start: 2018-08-28

## 2018-10-01 NOTE — PROGRESS NOTES
Allergy Clinic Note  [facility]    Subjective:          Chief Complaint: Follow-up (review labs)      Allergy problem list  Two types       Blotchy rash on neck and upper chest      Acute Urticaria / angioedema - resolved               With possible laryngeal edema  Spring/summer rhinitis  Enlarged thyroid    History of Present Illness: Leslye Witt is a 38 y.o. female with acute urticaria/angioedema who returns at my request to follow up symptoms and test results.    At last visit, she was given instructions to take H1 and H2 antihistamines if hive symptoms recurred.    Today she reports no recurrence of urticaria, angioedema or other rashes.  She does report mild itching under both eyes for the last few days. There is no associated rash.  No clear precipitants.    Related medications  EpiPen   If hives recur:   Zyrtec 10 mg twice a day   Zantac 150 mg twice a day   Atarax 25 mg every evening       No new problems or complaints.    Additional History:   Interval Hx is unremarkable.  Client is a lifetime nonsmoker.  Past medical, family, and social histories are unchanged.       Patient Active Problem List   Diagnosis    Personal history of cervical dysplasia    S/P laparoscopic hysterectomy    Chronic right shoulder pain, has rotator cuff tear     Current Outpatient Medications on File Prior to Visit   Medication Sig Dispense Refill    CALCIUM CARBONATE (CALCIUM 500 ORAL) Take 500 mg by mouth once daily.       cetirizine (ZYRTEC) 10 MG tablet Take 10 mg by mouth once daily.        multivitamin capsule Take 1 capsule by mouth once daily.        ranitidine (ZANTAC) 150 MG tablet Take 1 tablet (150 mg total) by mouth 2 (two) times daily. 60 tablet 11    vitamin D 1000 units Tab Take 1,000 Units by mouth once daily.      EPIPEN 2-EWA 0.3 mg/0.3 mL AtIn   0    hydrOXYzine HCl (ATARAX) 25 MG tablet Take 1 tablet (25 mg total) by mouth every evening. 30 tablet 0     No current  "facility-administered medications on file prior to visit.          Review of Systems   Constitutional: Negative for chills and fever.   HENT: Negative for ear discharge and nosebleeds.    Eyes: Negative for discharge and redness.   Respiratory: Negative for hemoptysis, sputum production and stridor.    Gastrointestinal: Negative for blood in stool, melena and vomiting.   Genitourinary: Negative for dysuria and hematuria.   Skin: Negative for itching and rash.   Neurological: Negative for seizures and loss of consciousness.       Objective:   /76 (BP Location: Left arm, Patient Position: Sitting, BP Method: Medium (Manual))   Ht 5' 4" (1.626 m)   Wt 59.6 kg (131 lb 6.3 oz)   LMP 09/03/2015   BMI 22.55 kg/m²       Physical Exam   Constitutional: She is oriented to person, place, and time and well-developed, well-nourished, and in no distress.   HENT:   Head: Normocephalic and atraumatic.   Nose: Nose normal.   Eyes: Conjunctivae are normal. No scleral icterus.   Neck: Neck supple.   Cardiovascular: Normal rate, regular rhythm and intact distal pulses.   Pulmonary/Chest: Effort normal. No stridor. No respiratory distress.   Abdominal: She exhibits no distension.   Musculoskeletal: She exhibits no edema or deformity.   Neurological: She is alert and oriented to person, place, and time.   Skin: No rash noted. No erythema.   Psychiatric: Affect and judgment normal.       Data:   Aeroallergen testing by the serum Immunocap method (09/24/2018) was entirely negative  Total serum IgE < 35    Labs (09/24/2018)   Normal  thyroid autoantibodies      (specifically thyroglobulin antibody screen and thyroid peroxidase antibodies)  Baseline serum tryptase normal    Ultrasound of neck normal.  Thyroid not considered enlarged.  No abnormal nodes or mass seen.    Assessment:     1. Hx of urticaria    2. History of angioedema        Plan:     Medical Decision Making:  Client does not, in fact, have any thyromegaly as measured " by ultrasound.  She is also euthyroid, both clinically and chemically.    Hx of acute urticaria /  Angioedema - resolved  Results discussed and copy given  Return if hives recur    Patient Instructions   If hives recur:   Zyrtec 10 mg twice a day   Zantac 150 mg twice a day   Atarax 25 mg every evening     Sx diary    And return here.      Follow-up if symptoms recur.    Sloane Bush MD

## 2018-10-01 NOTE — PATIENT INSTRUCTIONS
If hives recur:   Zyrtec 10 mg twice a day   Zantac 150 mg twice a day   Atarax 25 mg every evening     Sx diary    And return here.

## 2018-10-02 VITALS — BODY MASS INDEX: 21.94 KG/M2 | HEIGHT: 64 IN | WEIGHT: 128.5 LBS

## 2019-01-03 ENCOUNTER — OFFICE VISIT (OUTPATIENT)
Dept: FAMILY MEDICINE | Facility: CLINIC | Age: 39
End: 2019-01-03
Payer: COMMERCIAL

## 2019-01-03 VITALS
DIASTOLIC BLOOD PRESSURE: 88 MMHG | TEMPERATURE: 99 F | OXYGEN SATURATION: 99 % | HEIGHT: 64 IN | BODY MASS INDEX: 22.92 KG/M2 | HEART RATE: 103 BPM | WEIGHT: 134.25 LBS | SYSTOLIC BLOOD PRESSURE: 126 MMHG

## 2019-01-03 DIAGNOSIS — J20.9 ACUTE BRONCHITIS, UNSPECIFIED ORGANISM: Primary | ICD-10-CM

## 2019-01-03 PROCEDURE — 99999 PR PBB SHADOW E&M-EST. PATIENT-LVL IV: ICD-10-PCS | Mod: PBBFAC,,, | Performed by: PHYSICIAN ASSISTANT

## 2019-01-03 PROCEDURE — 3008F BODY MASS INDEX DOCD: CPT | Mod: CPTII,S$GLB,, | Performed by: PHYSICIAN ASSISTANT

## 2019-01-03 PROCEDURE — 99214 PR OFFICE/OUTPT VISIT, EST, LEVL IV, 30-39 MIN: ICD-10-PCS | Mod: S$GLB,,, | Performed by: PHYSICIAN ASSISTANT

## 2019-01-03 PROCEDURE — 99999 PR PBB SHADOW E&M-EST. PATIENT-LVL IV: CPT | Mod: PBBFAC,,, | Performed by: PHYSICIAN ASSISTANT

## 2019-01-03 PROCEDURE — 99214 OFFICE O/P EST MOD 30 MIN: CPT | Mod: S$GLB,,, | Performed by: PHYSICIAN ASSISTANT

## 2019-01-03 PROCEDURE — 3008F PR BODY MASS INDEX (BMI) DOCUMENTED: ICD-10-PCS | Mod: CPTII,S$GLB,, | Performed by: PHYSICIAN ASSISTANT

## 2019-01-03 RX ORDER — PROMETHAZINE HYDROCHLORIDE AND DEXTROMETHORPHAN HYDROBROMIDE 6.25; 15 MG/5ML; MG/5ML
5 SYRUP ORAL 4 TIMES DAILY PRN
Qty: 120 ML | Refills: 0 | Status: SHIPPED | OUTPATIENT
Start: 2019-01-03 | End: 2019-01-10

## 2019-01-03 RX ORDER — FLUTICASONE PROPIONATE 50 MCG
1 SPRAY, SUSPENSION (ML) NASAL 2 TIMES DAILY
Qty: 16 G | Refills: 12 | Status: SHIPPED | OUTPATIENT
Start: 2019-01-03 | End: 2019-02-02

## 2019-01-03 RX ORDER — AZITHROMYCIN 250 MG/1
TABLET, FILM COATED ORAL
Qty: 6 TABLET | Refills: 0 | Status: SHIPPED | OUTPATIENT
Start: 2019-01-03 | End: 2019-01-08

## 2019-01-03 RX ORDER — ALBUTEROL SULFATE 90 UG/1
2 AEROSOL, METERED RESPIRATORY (INHALATION) EVERY 6 HOURS PRN
Qty: 1 EACH | Refills: 0 | Status: SHIPPED | OUTPATIENT
Start: 2019-01-03 | End: 2021-01-15

## 2019-01-03 RX ORDER — DESLORATADINE 5 MG/1
5 TABLET ORAL DAILY
Qty: 30 TABLET | Refills: 11 | Status: SHIPPED | OUTPATIENT
Start: 2019-01-03 | End: 2021-01-15

## 2019-01-03 NOTE — PROGRESS NOTES
Subjective:       Patient ID: Leslye iWtt is a 38 y.o. female with multiple medical diagnoses as listed in the medical history and problem list that presents for Cough  .    Chief Complaint: Cough      Cough   This is a new problem. The current episode started 1 to 4 weeks ago (tues 12/25). The problem has been gradually worsening. The problem occurs every few minutes. The cough is productive of sputum. Associated symptoms include chills, ear congestion, ear pain (pressure ), myalgias, nasal congestion and a sore throat. Pertinent negatives include no chest pain, eye redness, fever, headaches, heartburn, hemoptysis, postnasal drip (tickle ), rash, rhinorrhea, shortness of breath, sweats, weight loss or wheezing. Nothing aggravates the symptoms. She has tried OTC cough suppressant, body position changes and rest (mucinex DM, robutussin nighttime, halls ) for the symptoms. The treatment provided mild relief. Her past medical history is significant for environmental allergies. There is no history of asthma, bronchiectasis, bronchitis, COPD, emphysema or pneumonia.     Review of Systems   Constitutional: Positive for chills and fatigue. Negative for fever and weight loss.   HENT: Positive for ear pain (pressure ), sore throat and voice change (saturday ). Negative for congestion, postnasal drip (tickle ), rhinorrhea, sinus pressure, sinus pain and sneezing.    Eyes: Negative for pain, discharge, redness and itching.   Respiratory: Positive for cough (dry ) and chest tightness. Negative for hemoptysis, shortness of breath and wheezing.         Congestion in chest    Cardiovascular: Negative for chest pain.   Gastrointestinal: Negative for abdominal pain, constipation, diarrhea, heartburn, nausea and vomiting.   Musculoskeletal: Positive for myalgias.   Skin: Negative for rash.   Allergic/Immunologic: Positive for environmental allergies.   Neurological: Negative for headaches.         PAST MEDICAL HISTORY:  Past  Medical History:   Diagnosis Date    Abnormal Pap smear of vagina 1998    Allergy     GERD (gastroesophageal reflux disease)        SOCIAL HISTORY:  Social History     Socioeconomic History    Marital status:      Spouse name: Not on file    Number of children: Not on file    Years of education: Not on file    Highest education level: Not on file   Social Needs    Financial resource strain: Not on file    Food insecurity - worry: Not on file    Food insecurity - inability: Not on file    Transportation needs - medical: Not on file    Transportation needs - non-medical: Not on file   Occupational History    Not on file   Tobacco Use    Smoking status: Never Smoker    Smokeless tobacco: Never Used   Substance and Sexual Activity    Alcohol use: Yes     Alcohol/week: 1.5 oz     Types: 3 Standard drinks or equivalent per week    Drug use: No    Sexual activity: Yes     Partners: Male     Birth control/protection: Other-see comments     Comment:  had a vasectomy   Other Topics Concern    Not on file   Social History Narrative    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    Travel: [place] [employer]       ALLERGIES AND MEDICATIONS: updated and reviewed.  Review of patient's allergies indicates:  No Known Allergies  Current Outpatient Medications   Medication Sig Dispense Refill    CALCIUM CARBONATE (CALCIUM 500 ORAL) Take 500 mg by mouth once daily.       EPIPEN 2-EWA 0.3 mg/0.3 mL AtIn   0    multivitamin capsule Take 1 capsule by mouth once daily.        vitamin D 1000 units Tab Take 1,000 Units by mouth once daily.      albuterol (PROVENTIL/VENTOLIN HFA) 90 mcg/actuation inhaler Inhale 2 puffs into the lungs every 6 (six) hours as needed for Wheezing. 1 each 0    azithromycin (Z-EWA) 250 MG tablet Take 2 tablets by mouth on day 1; Take 1 tablet by mouth on days 2-5 6 tablet 0    desloratadine (CLARINEX) 5 mg tablet Take 1 tablet (5 mg total) by mouth once daily. 30 tablet  "11    fluticasone (FLONASE) 50 mcg/actuation nasal spray 1 spray (50 mcg total) by Each Nare route 2 (two) times daily. 16 g 12    promethazine-dextromethorphan (PROMETHAZINE-DM) 6.25-15 mg/5 mL Syrp Take 5 mLs by mouth 4 (four) times daily as needed. 120 mL 0     No current facility-administered medications for this visit.          Objective:   /88   Pulse 103   Temp 98.8 °F (37.1 °C) (Oral)   Ht 5' 4" (1.626 m)   Wt 60.9 kg (134 lb 4.2 oz)   LMP 09/03/2015   SpO2 99%   BMI 23.05 kg/m²      Physical Exam   Constitutional: She is oriented to person, place, and time. No distress.   HENT:   Head: Normocephalic and atraumatic.   Right Ear: Tympanic membrane, external ear and ear canal normal.   Left Ear: Tympanic membrane, external ear and ear canal normal.   Nose: No rhinorrhea. Right sinus exhibits no maxillary sinus tenderness and no frontal sinus tenderness. Left sinus exhibits no maxillary sinus tenderness and no frontal sinus tenderness.   Mouth/Throat: Uvula is midline, oropharynx is clear and moist and mucous membranes are normal. No oropharyngeal exudate.   Right nasal congestion with clear discharge    Eyes: Conjunctivae are normal.   Cardiovascular: Normal rate and regular rhythm.   Pulmonary/Chest: Effort normal. She has no decreased breath sounds. She has no wheezes. She has no rhonchi. She has no rales.   Negative egophony    Lymphadenopathy:     She has no cervical adenopathy.   Neurological: She is alert and oriented to person, place, and time.           Assessment:       1. Acute bronchitis, unspecified organism        Plan:       Acute bronchitis, unspecified organism  -     desloratadine (CLARINEX) 5 mg tablet; Take 1 tablet (5 mg total) by mouth once daily.  Dispense: 30 tablet; Refill: 11  -     albuterol (PROVENTIL/VENTOLIN HFA) 90 mcg/actuation inhaler; Inhale 2 puffs into the lungs every 6 (six) hours as needed for Wheezing.  Dispense: 1 each; Refill: 0  -     azithromycin (Z-EWA) " 250 MG tablet; Take 2 tablets by mouth on day 1; Take 1 tablet by mouth on days 2-5  Dispense: 6 tablet; Refill: 0    Other orders  -     fluticasone (FLONASE) 50 mcg/actuation nasal spray; 1 spray (50 mcg total) by Each Nare route 2 (two) times daily.  Dispense: 16 g; Refill: 12  -     promethazine-dextromethorphan (PROMETHAZINE-DM) 6.25-15 mg/5 mL Syrp; Take 5 mLs by mouth 4 (four) times daily as needed.  Dispense: 120 mL; Refill: 0            No Follow-up on file.

## 2019-01-03 NOTE — PROGRESS NOTES
Answers for HPI/ROS submitted by the patient on 1/2/2019   Cough  Chronicity: new  Onset: in the past 7 days  Progression since onset: gradually worsening  Frequency: every few minutes  Cough characteristics: productive of sputum  chest pain: No  chills: No  ear congestion: Yes  ear pain: Yes  fever: No  headaches: No  heartburn: No  hemoptysis: No  myalgias: Yes  nasal congestion: Yes  postnasal drip: No  rash: No  rhinorrhea: No  shortness of breath: No  sore throat: Yes  sweats: No  weight loss: No  wheezing: No  Aggravated by: nothing  asthma: No  bronchiectasis: No  bronchitis: No  COPD: No  emphysema: No  environmental allergies: Yes  pneumonia: No  Treatments tried: OTC cough suppressant, body position changes, rest  Improvement on treatment: mild

## 2019-05-24 ENCOUNTER — PATIENT OUTREACH (OUTPATIENT)
Dept: ADMINISTRATIVE | Facility: HOSPITAL | Age: 39
End: 2019-05-24

## 2019-06-07 ENCOUNTER — OFFICE VISIT (OUTPATIENT)
Dept: FAMILY MEDICINE | Facility: CLINIC | Age: 39
End: 2019-06-07
Payer: COMMERCIAL

## 2019-06-07 ENCOUNTER — LAB VISIT (OUTPATIENT)
Dept: LAB | Facility: HOSPITAL | Age: 39
End: 2019-06-07
Attending: FAMILY MEDICINE
Payer: COMMERCIAL

## 2019-06-07 VITALS
OXYGEN SATURATION: 98 % | TEMPERATURE: 99 F | WEIGHT: 131.38 LBS | BODY MASS INDEX: 22.43 KG/M2 | HEIGHT: 64 IN | HEART RATE: 78 BPM | RESPIRATION RATE: 15 BRPM | DIASTOLIC BLOOD PRESSURE: 82 MMHG | SYSTOLIC BLOOD PRESSURE: 126 MMHG

## 2019-06-07 DIAGNOSIS — M25.511 CHRONIC RIGHT SHOULDER PAIN: ICD-10-CM

## 2019-06-07 DIAGNOSIS — Z00.00 ANNUAL PHYSICAL EXAM: Primary | ICD-10-CM

## 2019-06-07 DIAGNOSIS — Z23 NEED FOR TDAP VACCINATION: ICD-10-CM

## 2019-06-07 DIAGNOSIS — G89.29 CHRONIC RIGHT SHOULDER PAIN: ICD-10-CM

## 2019-06-07 DIAGNOSIS — Z00.00 ANNUAL PHYSICAL EXAM: ICD-10-CM

## 2019-06-07 LAB
ALBUMIN SERPL BCP-MCNC: 4.4 G/DL (ref 3.5–5.2)
ALP SERPL-CCNC: 58 U/L (ref 55–135)
ALT SERPL W/O P-5'-P-CCNC: 28 U/L (ref 10–44)
ANION GAP SERPL CALC-SCNC: 8 MMOL/L (ref 8–16)
AST SERPL-CCNC: 26 U/L (ref 10–40)
BASOPHILS # BLD AUTO: 0.02 K/UL (ref 0–0.2)
BASOPHILS NFR BLD: 0.4 % (ref 0–1.9)
BILIRUB SERPL-MCNC: 0.7 MG/DL (ref 0.1–1)
BUN SERPL-MCNC: 10 MG/DL (ref 6–20)
CALCIUM SERPL-MCNC: 10.1 MG/DL (ref 8.7–10.5)
CHLORIDE SERPL-SCNC: 103 MMOL/L (ref 95–110)
CHOLEST SERPL-MCNC: 197 MG/DL (ref 120–199)
CHOLEST/HDLC SERPL: 2.1 {RATIO} (ref 2–5)
CO2 SERPL-SCNC: 29 MMOL/L (ref 23–29)
CREAT SERPL-MCNC: 0.8 MG/DL (ref 0.5–1.4)
DIFFERENTIAL METHOD: ABNORMAL
EOSINOPHIL # BLD AUTO: 0.1 K/UL (ref 0–0.5)
EOSINOPHIL NFR BLD: 1.1 % (ref 0–8)
ERYTHROCYTE [DISTWIDTH] IN BLOOD BY AUTOMATED COUNT: 13.7 % (ref 11.5–14.5)
EST. GFR  (AFRICAN AMERICAN): >60 ML/MIN/1.73 M^2
EST. GFR  (NON AFRICAN AMERICAN): >60 ML/MIN/1.73 M^2
ESTIMATED AVG GLUCOSE: 94 MG/DL (ref 68–131)
GLUCOSE SERPL-MCNC: 88 MG/DL (ref 70–110)
HBA1C MFR BLD HPLC: 4.9 % (ref 4–5.6)
HCT VFR BLD AUTO: 45 % (ref 37–48.5)
HDLC SERPL-MCNC: 95 MG/DL (ref 40–75)
HDLC SERPL: 48.2 % (ref 20–50)
HGB BLD-MCNC: 14.6 G/DL (ref 12–16)
LDLC SERPL CALC-MCNC: 87 MG/DL (ref 63–159)
LYMPHOCYTES # BLD AUTO: 1.1 K/UL (ref 1–4.8)
LYMPHOCYTES NFR BLD: 23.2 % (ref 18–48)
MCH RBC QN AUTO: 31.2 PG (ref 27–31)
MCHC RBC AUTO-ENTMCNC: 32.4 G/DL (ref 32–36)
MCV RBC AUTO: 96 FL (ref 82–98)
MONOCYTES # BLD AUTO: 0.5 K/UL (ref 0.3–1)
MONOCYTES NFR BLD: 10.2 % (ref 4–15)
NEUTROPHILS # BLD AUTO: 3.1 K/UL (ref 1.8–7.7)
NEUTROPHILS NFR BLD: 65.1 % (ref 38–73)
NONHDLC SERPL-MCNC: 102 MG/DL
PLATELET # BLD AUTO: 206 K/UL (ref 150–350)
PMV BLD AUTO: 11 FL (ref 9.2–12.9)
POTASSIUM SERPL-SCNC: 4.2 MMOL/L (ref 3.5–5.1)
PROT SERPL-MCNC: 7.2 G/DL (ref 6–8.4)
RBC # BLD AUTO: 4.68 M/UL (ref 4–5.4)
SODIUM SERPL-SCNC: 140 MMOL/L (ref 136–145)
TRIGL SERPL-MCNC: 75 MG/DL (ref 30–150)
TSH SERPL DL<=0.005 MIU/L-ACNC: 1.28 UIU/ML (ref 0.4–4)
URATE SERPL-MCNC: 4 MG/DL (ref 2.4–5.7)
WBC # BLD AUTO: 4.7 K/UL (ref 3.9–12.7)

## 2019-06-07 PROCEDURE — 99396 PREV VISIT EST AGE 40-64: CPT | Mod: 25,S$GLB,, | Performed by: FAMILY MEDICINE

## 2019-06-07 PROCEDURE — 83036 HEMOGLOBIN GLYCOSYLATED A1C: CPT

## 2019-06-07 PROCEDURE — 84550 ASSAY OF BLOOD/URIC ACID: CPT

## 2019-06-07 PROCEDURE — 80053 COMPREHEN METABOLIC PANEL: CPT

## 2019-06-07 PROCEDURE — 84443 ASSAY THYROID STIM HORMONE: CPT

## 2019-06-07 PROCEDURE — 90471 TDAP VACCINE GREATER THAN OR EQUAL TO 7YO IM: ICD-10-PCS | Mod: S$GLB,,, | Performed by: FAMILY MEDICINE

## 2019-06-07 PROCEDURE — 99396 PR PREVENTIVE VISIT,EST,40-64: ICD-10-PCS | Mod: 25,S$GLB,, | Performed by: FAMILY MEDICINE

## 2019-06-07 PROCEDURE — 90715 TDAP VACCINE GREATER THAN OR EQUAL TO 7YO IM: ICD-10-PCS | Mod: S$GLB,,, | Performed by: FAMILY MEDICINE

## 2019-06-07 PROCEDURE — 90715 TDAP VACCINE 7 YRS/> IM: CPT | Mod: S$GLB,,, | Performed by: FAMILY MEDICINE

## 2019-06-07 PROCEDURE — 80061 LIPID PANEL: CPT

## 2019-06-07 PROCEDURE — 90471 IMMUNIZATION ADMIN: CPT | Mod: S$GLB,,, | Performed by: FAMILY MEDICINE

## 2019-06-07 PROCEDURE — 99999 PR PBB SHADOW E&M-EST. PATIENT-LVL III: CPT | Mod: PBBFAC,,, | Performed by: FAMILY MEDICINE

## 2019-06-07 PROCEDURE — 99999 PR PBB SHADOW E&M-EST. PATIENT-LVL III: ICD-10-PCS | Mod: PBBFAC,,, | Performed by: FAMILY MEDICINE

## 2019-06-07 PROCEDURE — 85025 COMPLETE CBC W/AUTO DIFF WBC: CPT

## 2019-06-07 RX ORDER — CETIRIZINE HYDROCHLORIDE 10 MG/1
10 TABLET ORAL DAILY
COMMUNITY

## 2019-06-07 NOTE — PROGRESS NOTES
"Chief Complaint   Patient presents with    Annual Exam     SUBJECTIVE:   38 y.o. female for annual routine checkup.  Current Outpatient Medications   Medication Sig Dispense Refill    CALCIUM CARBONATE (CALCIUM 500 ORAL) Take 500 mg by mouth once daily.       cetirizine (ZYRTEC) 10 MG tablet Take 10 mg by mouth once daily.      EPIPEN 2-EWA 0.3 mg/0.3 mL AtIn   0    multivitamin capsule Take 1 capsule by mouth once daily.        vitamin D 1000 units Tab Take 1,000 Units by mouth once daily.      albuterol (PROVENTIL/VENTOLIN HFA) 90 mcg/actuation inhaler Inhale 2 puffs into the lungs every 6 (six) hours as needed for Wheezing. 1 each 0    desloratadine (CLARINEX) 5 mg tablet Take 1 tablet (5 mg total) by mouth once daily. 30 tablet 11     No current facility-administered medications for this visit.      Allergies: Patient has no known allergies.   Patient's last menstrual period was 09/03/2015.    ROS:  Feeling well. No dyspnea or chest pain on exertion.  No abdominal pain, change in bowel habits, black or bloody stools.  No urinary tract symptoms. GYN ROS: deferred. No neurological complaints. Recurrent hives/rash, no real issue    OBJECTIVE:   The patient appears well, alert, oriented x 3, in no distress.  /82 (BP Location: Right arm, Patient Position: Sitting, BP Method: Medium (Manual))   Pulse 78   Temp 98.8 °F (37.1 °C) (Oral)   Resp 15   Ht 5' 4" (1.626 m)   Wt 59.6 kg (131 lb 6.3 oz)   LMP 09/03/2015   SpO2 98%   BMI 22.55 kg/m²      Wt Readings from Last 15 Encounters:   06/07/19 59.6 kg (131 lb 6.3 oz)   01/03/19 60.9 kg (134 lb 4.2 oz)   10/01/18 59.6 kg (131 lb 6.3 oz)   10/02/18 58.3 kg (128 lb 8.5 oz)   08/28/18 62.7 kg (138 lb 3.7 oz)   05/31/18 56.7 kg (125 lb)   04/05/18 57.1 kg (125 lb 14.1 oz)   02/08/18 60.6 kg (133 lb 9.6 oz)   01/27/17 58.7 kg (129 lb 6.6 oz)   11/06/15 56.3 kg (124 lb 1.9 oz)   10/07/15 58.5 kg (129 lb)   10/05/15 58.5 kg (129 lb)   10/05/15 58.8 kg (129 " lb 9.6 oz)   08/03/15 58.2 kg (128 lb 3.2 oz)   05/05/15 55.8 kg (123 lb)       ENT normal.  Neck supple. No adenopathy or thyromegaly. DONOVAN. Lungs are clear, good air entry, no wheezes, rhonchi or rales. S1 and S2 normal, no murmurs, regular rate and rhythm. Abdomen soft without tenderness, guarding, mass or organomegaly. Extremities show no edema, normal peripheral pulses. Neurological is normal, no focal findings.    BREAST EXAM: dfefered    PELVIC EXAM: deferred    ASSESSMENT:   1. Annual physical exam    2. Chronic right shoulder pain, has rotator cuff tear          PLAN:   Leslye was seen today for annual exam.    Diagnoses and all orders for this visit:    Annual physical exam    Chronic right shoulder pain, has rotator cuff tear      Counseled on age appropriate medical preventative services, including age appropriate cancer screenings, over all nutritional health, need for a consistent exercise regimen and an over all push towards maintaining a vigorous and active lifestyle.  Counseled on age appropriate vaccines and discussed upcoming health care needs based on age/gender.  Spent time with patient counseling on need for a good patient/doctor relationship moving forward.  Discussed use of common OTC medications and supplements.  Discussed common dietary aids and use of caffeine and the need for good sleep hygiene and stress management.

## 2019-10-01 ENCOUNTER — OFFICE VISIT (OUTPATIENT)
Dept: FAMILY MEDICINE | Facility: CLINIC | Age: 39
End: 2019-10-01
Payer: COMMERCIAL

## 2019-10-01 VITALS
BODY MASS INDEX: 23.37 KG/M2 | DIASTOLIC BLOOD PRESSURE: 86 MMHG | WEIGHT: 136.88 LBS | HEART RATE: 101 BPM | TEMPERATURE: 99 F | HEIGHT: 64 IN | OXYGEN SATURATION: 98 % | SYSTOLIC BLOOD PRESSURE: 128 MMHG

## 2019-10-01 DIAGNOSIS — J01.91 ACUTE RECURRENT SINUSITIS, UNSPECIFIED LOCATION: Primary | ICD-10-CM

## 2019-10-01 PROCEDURE — 99214 PR OFFICE/OUTPT VISIT, EST, LEVL IV, 30-39 MIN: ICD-10-PCS | Mod: S$GLB,,, | Performed by: FAMILY MEDICINE

## 2019-10-01 PROCEDURE — 99214 OFFICE O/P EST MOD 30 MIN: CPT | Mod: S$GLB,,, | Performed by: FAMILY MEDICINE

## 2019-10-01 PROCEDURE — 3008F BODY MASS INDEX DOCD: CPT | Mod: CPTII,S$GLB,, | Performed by: FAMILY MEDICINE

## 2019-10-01 PROCEDURE — 99999 PR PBB SHADOW E&M-EST. PATIENT-LVL III: CPT | Mod: PBBFAC,,, | Performed by: FAMILY MEDICINE

## 2019-10-01 PROCEDURE — 3008F PR BODY MASS INDEX (BMI) DOCUMENTED: ICD-10-PCS | Mod: CPTII,S$GLB,, | Performed by: FAMILY MEDICINE

## 2019-10-01 PROCEDURE — 99999 PR PBB SHADOW E&M-EST. PATIENT-LVL III: ICD-10-PCS | Mod: PBBFAC,,, | Performed by: FAMILY MEDICINE

## 2019-10-01 RX ORDER — AZELASTINE 1 MG/ML
1 SPRAY, METERED NASAL 2 TIMES DAILY
Qty: 30 ML | Refills: 0 | Status: SHIPPED | OUTPATIENT
Start: 2019-10-01 | End: 2021-01-15

## 2019-10-01 RX ORDER — DOXYCYCLINE HYCLATE 100 MG
100 TABLET ORAL 2 TIMES DAILY
Qty: 28 TABLET | Refills: 0 | Status: SHIPPED | OUTPATIENT
Start: 2019-10-01 | End: 2021-01-15

## 2019-10-01 NOTE — PROGRESS NOTES
Chief Complaint   Patient presents with    Cough     2 week's- productive-clear phlegm       SUBJECTIVE:  Leslye Witt is a 39 y.o. female here for new problem of nose with congestion and PND and having cough x 2 weeks with productive clear sputum, no fever or chills and just having issues with the chest and malaise.  Currently has co-morbidities including per problem list.      Past Medical History:   Diagnosis Date    Abnormal Pap smear of vagina 1998    Allergy     GERD (gastroesophageal reflux disease)      Past Surgical History:   Procedure Laterality Date    DILATION AND CURETTAGE OF UTERUS      [facility]STERECT[facility]Y  10/7/2015    [facility]    hysteroscopy & endometrial ablation      LEEP  1998    right hip scope      right hip surgery labrum       Social History     Socioeconomic History    Marital status:      Spouse name: Not on file    Number of children: Not on file    Years of education: Not on file    Highest education level: Not on file   Occupational History    Not on file   Social Needs    Financial resource strain: Not on file    Food insecurity:     Worry: Not on file     Inability: Not on file    Transportation needs:     Medical: Not on file     Non-medical: Not on file   Tobacco Use    Smoking status: Never Smoker    Smokeless tobacco: Never Used   Substance and Sexual Activity    Alcohol use: Yes     Alcohol/week: 2.5 standard drinks     Types: 3 Standard drinks or equivalent per week     Comment: socially    Drug use: No    Sexual activity: Yes     Partners: Male     Birth control/protection: Other-see comments     Comment:  had a vasectomy   Lifestyle    Physical activity:     Days per week: Not on file     Minutes per session: Not on file    Stress: Not at all   Relationships    Social connections:     Talks on phone: Not on file     Gets together: Not on file     Attends Restorationism service: Not on file     Active member of club or organization: Not on file      "Attends meetings of clubs or organizations: Not on file     Relationship status: Not on file   Other Topics Concern    Not on file   Social History Narrative    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    Travel: [place] [employer]     Family History   Problem Relation Age of Onset    Hyperlipidemia Mother     Osteoporosis Mother 50    Hypertension Brother     Diabetes Paternal Aunt     Diabetes Paternal Uncle     Diabetes Maternal Grandmother     Hyperlipidemia Maternal Grandmother     Hypertension Maternal Grandmother     Stroke Maternal Grandmother     Vision loss Maternal Grandmother     No Known Problems Daughter     No Known Problems Son     Breast cancer Neg Hx     Colon cancer Neg Hx     Ovarian cancer Neg Hx      Current Outpatient Medications on File Prior to Visit   Medication Sig Dispense Refill    albuterol (PROVENTIL/VENTOLIN HFA) 90 mcg/actuation inhaler Inhale 2 puffs into the lungs every 6 (six) hours as needed for Wheezing. 1 each 0    CALCIUM CARBONATE (CALCIUM 500 ORAL) Take 500 mg by mouth once daily.       cetirizine (ZYRTEC) 10 MG tablet Take 10 mg by mouth once daily.      EPIPEN 2-EWA 0.3 mg/0.3 mL AtIn   0    multivitamin capsule Take 1 capsule by mouth once daily.        vitamin D 1000 units Tab Take 1,000 Units by mouth once daily.      desloratadine (CLARINEX) 5 mg tablet Take 1 tablet (5 mg total) by mouth once daily. (Patient not taking: Reported on 10/1/2019) 30 tablet 11     No current facility-administered medications on file prior to visit.      Review of patient's allergies indicates:  No Known Allergies      ROS  Per HPI      OBJECTIVE:  /86   Pulse 101   Temp 98.5 °F (36.9 °C) (Oral)   Ht 5' 4" (1.626 m)   Wt 62.1 kg (136 lb 14.5 oz)   LMP 09/03/2015   SpO2 98%   BMI 23.50 kg/m²     Wt Readings from Last 3 Encounters:   10/01/19 62.1 kg (136 lb 14.5 oz)   06/07/19 59.6 kg (131 lb 6.3 oz)   01/03/19 60.9 kg (134 lb 4.2 oz)     BP Readings " from Last 3 Encounters:   10/01/19 128/86   06/07/19 126/82   01/03/19 126/88       She appears well, in no apparent distress.  Alert and oriented times three, pleasant and cooperative. Vital signs are as documented in vital signs section.  Nose with coryza  Throat with PND   Thick mucus  No sinus TTP  Lungs with mucus but with deep cough clear and no issues.    Review of old Records:      Review of old labs:      Review of old imaging:      ASSESSMENT:  Problem List Items Addressed This Visit     None      Visit Diagnoses     Acute recurrent sinusitis, unspecified location    -  Primary    Relevant Medications    doxycycline (VIBRA-TABS) 100 MG tablet    azelastine (ASTELIN) 137 mcg (0.1 %) nasal spray          ICD-10-CM ICD-9-CM   1. Acute recurrent sinusitis, unspecified location J01.91 461.9         PLAN:  1. Acute recurrent sinusitis, unspecified location  Potential medication side effects were discussed with the patient; let me know if any occur.  Appropriate fluid management.  - doxycycline (VIBRA-TABS) 100 MG tablet; Take 1 tablet (100 mg total) by mouth 2 (two) times daily.  Dispense: 28 tablet; Refill: 0      Medication List with Changes/Refills   New Medications    AZELASTINE (ASTELIN) 137 MCG (0.1 %) NASAL SPRAY    1 spray (137 mcg total) by Nasal route 2 (two) times daily.    DOXYCYCLINE (VIBRA-TABS) 100 MG TABLET    Take 1 tablet (100 mg total) by mouth 2 (two) times daily.   Current Medications    ALBUTEROL (PROVENTIL/VENTOLIN HFA) 90 MCG/ACTUATION INHALER    Inhale 2 puffs into the lungs every 6 (six) hours as needed for Wheezing.    CALCIUM CARBONATE (CALCIUM 500 ORAL)    Take 500 mg by mouth once daily.     CETIRIZINE (ZYRTEC) 10 MG TABLET    Take 10 mg by mouth once daily.    DESLORATADINE (CLARINEX) 5 MG TABLET    Take 1 tablet (5 mg total) by mouth once daily.    EPIPEN 2-EWA 0.3 MG/0.3 ML ATIN        MULTIVITAMIN CAPSULE    Take 1 capsule by mouth once daily.      VITAMIN D 1000 UNITS TAB     Take 1,000 Units by mouth once daily.       Follow up in about 2 weeks (around 10/15/2019), or if symptoms worsen or fail to improve, for reassess acute condition.

## 2020-01-08 ENCOUNTER — OFFICE VISIT (OUTPATIENT)
Dept: FAMILY MEDICINE | Facility: CLINIC | Age: 40
End: 2020-01-08
Payer: COMMERCIAL

## 2020-01-08 ENCOUNTER — LAB VISIT (OUTPATIENT)
Dept: LAB | Facility: HOSPITAL | Age: 40
End: 2020-01-08
Payer: COMMERCIAL

## 2020-01-08 VITALS
TEMPERATURE: 99 F | OXYGEN SATURATION: 98 % | RESPIRATION RATE: 16 BRPM | DIASTOLIC BLOOD PRESSURE: 90 MMHG | SYSTOLIC BLOOD PRESSURE: 140 MMHG | HEIGHT: 64 IN | BODY MASS INDEX: 23.82 KG/M2 | HEART RATE: 76 BPM | WEIGHT: 139.56 LBS

## 2020-01-08 DIAGNOSIS — Z12.39 BREAST CANCER SCREENING: Primary | ICD-10-CM

## 2020-01-08 DIAGNOSIS — F41.9 ANXIETY: ICD-10-CM

## 2020-01-08 DIAGNOSIS — R03.0 ELEVATED BP WITHOUT DIAGNOSIS OF HYPERTENSION: ICD-10-CM

## 2020-01-08 DIAGNOSIS — Z90.710 S/P LAPAROSCOPIC HYSTERECTOMY: ICD-10-CM

## 2020-01-08 DIAGNOSIS — R00.2 PALPITATIONS: ICD-10-CM

## 2020-01-08 LAB
T4 FREE SERPL-MCNC: 1 NG/DL (ref 0.71–1.51)
TSH SERPL DL<=0.005 MIU/L-ACNC: 1.08 UIU/ML (ref 0.4–4)

## 2020-01-08 PROCEDURE — 84439 ASSAY OF FREE THYROXINE: CPT

## 2020-01-08 PROCEDURE — 99214 OFFICE O/P EST MOD 30 MIN: CPT | Mod: S$GLB,,, | Performed by: PHYSICIAN ASSISTANT

## 2020-01-08 PROCEDURE — 99214 PR OFFICE/OUTPT VISIT, EST, LEVL IV, 30-39 MIN: ICD-10-PCS | Mod: S$GLB,,, | Performed by: PHYSICIAN ASSISTANT

## 2020-01-08 PROCEDURE — 3008F BODY MASS INDEX DOCD: CPT | Mod: CPTII,S$GLB,, | Performed by: PHYSICIAN ASSISTANT

## 2020-01-08 PROCEDURE — 99999 PR PBB SHADOW E&M-EST. PATIENT-LVL V: CPT | Mod: PBBFAC,,, | Performed by: PHYSICIAN ASSISTANT

## 2020-01-08 PROCEDURE — 36415 COLL VENOUS BLD VENIPUNCTURE: CPT | Mod: PO

## 2020-01-08 PROCEDURE — 3008F PR BODY MASS INDEX (BMI) DOCUMENTED: ICD-10-PCS | Mod: CPTII,S$GLB,, | Performed by: PHYSICIAN ASSISTANT

## 2020-01-08 PROCEDURE — 84443 ASSAY THYROID STIM HORMONE: CPT

## 2020-01-08 PROCEDURE — 99999 PR PBB SHADOW E&M-EST. PATIENT-LVL V: ICD-10-PCS | Mod: PBBFAC,,, | Performed by: PHYSICIAN ASSISTANT

## 2020-01-08 RX ORDER — HYDROGEN PEROXIDE 3 %
20 SOLUTION, NON-ORAL MISCELLANEOUS
COMMUNITY
End: 2021-01-15

## 2020-01-08 RX ORDER — BUSPIRONE HYDROCHLORIDE 5 MG/1
5 TABLET ORAL 2 TIMES DAILY PRN
Qty: 60 TABLET | Refills: 0 | Status: SHIPPED | OUTPATIENT
Start: 2020-01-08 | End: 2021-01-15

## 2020-01-08 NOTE — PROGRESS NOTES
Subjective:       Patient ID: Leslye Witt is a 39 y.o. female with multiple medical diagnoses as listed in the medical history and problem list that presents for Anxiety  .    Chief Complaint: Anxiety      Anxiety   Presents for initial (3-4 weeks ) visit. Onset was 1 to 4 weeks ago. The problem has been gradually worsening. Symptoms include chest pain, decreased concentration, excessive worry, insomnia, irritability, nervous/anxious behavior, palpitations, panic, restlessness and shortness of breath. Patient reports no depressed mood, dizziness, dry mouth, hyperventilation, nausea or suicidal ideas. The severity of symptoms is interfering with daily activities. 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     There is no history of anxiety/panic attacks, depression, fibromyalgia or hyperthyroidism. Treatments tried: meditation and exercise       hydroxyzine in the past made her too tired    Review of Systems   Constitutional: Positive for irritability.   Respiratory: Positive for shortness of breath.    Cardiovascular: Positive for chest pain and palpitations.   Gastrointestinal: Negative for nausea.   Neurological: Negative for dizziness.   Psychiatric/Behavioral: Positive for decreased concentration. Negative for suicidal ideas. The patient is nervous/anxious and has insomnia.          PAST MEDICAL HISTORY:  Past Medical History:   Diagnosis Date    Abnormal Pap smear of vagina 1998    Allergy     GERD (gastroesophageal reflux disease)        SOCIAL HISTORY:  Social History     Socioeconomic History    Marital status:      Spouse name: Not on file    Number of children: Not on file    Years of education: Not on file    Highest education level: Not on file   Occupational History    Not on file   Social Needs    Financial resource strain: Not on file    Food insecurity:     Worry: Not on  file     Inability: Not on file    Transportation needs:     Medical: Not on file     Non-medical: Not on file   Tobacco Use    Smoking status: Never Smoker    Smokeless tobacco: Never Used   Substance and Sexual Activity    Alcohol use: Yes     Alcohol/week: 2.5 standard drinks     Types: 3 Standard drinks or equivalent per week     Comment: socially    Drug use: No    Sexual activity: Yes     Partners: Male     Birth control/protection: Other-see comments     Comment:  had a vasectomy   Lifestyle    Physical activity:     Days per week: Not on file     Minutes per session: Not on file    Stress: Not at all   Relationships    Social connections:     Talks on phone: Not on file     Gets together: Not on file     Attends Jainism service: Not on file     Active member of club or organization: Not on file     Attends meetings of clubs or organizations: Not on file     Relationship status: Not on file   Other Topics Concern    Not on file   Social History Narrative    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    Travel: [place] [employer]       ALLERGIES AND MEDICATIONS: updated and reviewed.  Review of patient's allergies indicates:  No Known Allergies  Current Outpatient Medications   Medication Sig Dispense Refill    albuterol (PROVENTIL/VENTOLIN HFA) 90 mcg/actuation inhaler Inhale 2 puffs into the lungs every 6 (six) hours as needed for Wheezing. 1 each 0    CALCIUM CARBONATE (CALCIUM 500 ORAL) Take 500 mg by mouth once daily.       cetirizine (ZYRTEC) 10 MG tablet Take 10 mg by mouth once daily.      EPIPEN 2-EWA 0.3 mg/0.3 mL AtIn   0    esomeprazole (NEXIUM) 20 MG capsule Take 20 mg by mouth before breakfast.      multivitamin capsule Take 1 capsule by mouth once daily.        vitamin D 1000 units Tab Take 1,000 Units by mouth once daily.      azelastine (ASTELIN) 137 mcg (0.1 %) nasal spray 1 spray (137 mcg total) by Nasal route 2 (two) times daily. 30 mL 0    busPIRone (BUSPAR)  "5 MG Tab Take 1 tablet (5 mg total) by mouth 2 (two) times daily as needed. 60 tablet 0    desloratadine (CLARINEX) 5 mg tablet Take 1 tablet (5 mg total) by mouth once daily. (Patient not taking: Reported on 10/1/2019) 30 tablet 11    doxycycline (VIBRA-TABS) 100 MG tablet Take 1 tablet (100 mg total) by mouth 2 (two) times daily. 28 tablet 0     No current facility-administered medications for this visit.          Objective:   BP (!) 140/90   Pulse 76   Temp 98.7 °F (37.1 °C) (Oral)   Resp 16   Ht 5' 4" (1.626 m)   Wt 63.3 kg (139 lb 8.8 oz)   LMP 09/03/2015   SpO2 98%   BMI 23.95 kg/m²      Physical Exam   Constitutional: She is oriented to person, place, and time.   crying   HENT:   Head: Normocephalic and atraumatic.   Cardiovascular: Normal rate and regular rhythm.   Pulmonary/Chest: Effort normal and breath sounds normal.   Neurological: She is alert and oriented to person, place, and time.   Psychiatric: Judgment and thought content normal. Her mood appears anxious. Her speech is rapid and/or pressured. She is hyperactive. She is not agitated. Cognition and memory are normal.           Assessment:       1. Breast cancer screening    2. Palpitations    3. Anxiety    4. Elevated BP without diagnosis of hypertension    5. S/P laparoscopic hysterectomy        Plan:       Breast cancer screening  -     Mammo Digital Screening Bilateral With CAD; Future; Expected date: 01/08/2020    Palpitations  -     TSH; Future; Expected date: 01/08/2020  -     T4, free; Future; Expected date: 01/08/2020    Anxiety  -     Ambulatory referral to Psychiatry  -     busPIRone (BUSPAR) 5 MG Tab; Take 1 tablet (5 mg total) by mouth 2 (two) times daily as needed.  Dispense: 60 tablet; Refill: 0    Elevated BP without diagnosis of hypertension  Repeat in 3 weeks with tx of the above  Watch sodium.    S/P laparoscopic hysterectomy  Spotting, has cervix.  Due for gyn so will f/u with them          No follow-ups on file.  "

## 2020-01-16 ENCOUNTER — PATIENT OUTREACH (OUTPATIENT)
Dept: ADMINISTRATIVE | Facility: HOSPITAL | Age: 40
End: 2020-01-16

## 2020-01-17 ENCOUNTER — HOSPITAL ENCOUNTER (OUTPATIENT)
Dept: RADIOLOGY | Facility: HOSPITAL | Age: 40
Discharge: HOME | End: 2020-01-17
Attending: PHYSICIAN ASSISTANT
Payer: COMMERCIAL

## 2020-01-17 DIAGNOSIS — Z12.31 VISIT FOR SCREENING MAMMOGRAM: ICD-10-CM

## 2020-01-17 PROCEDURE — 77063 MAMMO DIGITAL SCREENING BILAT WITH TOMOSYNTHESIS_CAD: ICD-10-PCS | Mod: 26,,, | Performed by: RADIOLOGY

## 2020-01-17 PROCEDURE — 77067 SCR MAMMO BI INCL CAD: CPT | Mod: TC

## 2020-01-17 PROCEDURE — 77067 SCR MAMMO BI INCL CAD: CPT | Mod: 26,,, | Performed by: RADIOLOGY

## 2020-01-17 PROCEDURE — 77063 BREAST TOMOSYNTHESIS BI: CPT | Mod: 26,,, | Performed by: RADIOLOGY

## 2020-01-17 PROCEDURE — 77067 MAMMO DIGITAL SCREENING BILAT WITH TOMOSYNTHESIS_CAD: ICD-10-PCS | Mod: 26,,, | Performed by: RADIOLOGY

## 2020-01-24 ENCOUNTER — HOSPITAL ENCOUNTER (OUTPATIENT)
Dept: RADIOLOGY | Facility: HOSPITAL | Age: 40
Discharge: HOME | End: 2020-01-24
Attending: PHYSICIAN ASSISTANT
Payer: COMMERCIAL

## 2020-01-24 DIAGNOSIS — R92.8 ABNORMAL MAMMOGRAM OF RIGHT BREAST: ICD-10-CM

## 2020-01-24 DIAGNOSIS — R92.8 ABNORMAL MAMMOGRAM OF LEFT BREAST: ICD-10-CM

## 2020-01-24 PROCEDURE — 77062 MAMMO DIGITAL DIAGNOSTIC BILAT WITH TOMOSYNTHESIS_CAD: ICD-10-PCS | Mod: 26,,, | Performed by: RADIOLOGY

## 2020-01-24 PROCEDURE — 76642 US BREAST LEFT LIMITED: ICD-10-PCS | Mod: 26,LT,, | Performed by: RADIOLOGY

## 2020-01-24 PROCEDURE — 76642 ULTRASOUND BREAST LIMITED: CPT | Mod: TC,LT

## 2020-01-24 PROCEDURE — 77062 BREAST TOMOSYNTHESIS BI: CPT | Mod: 26,,, | Performed by: RADIOLOGY

## 2020-01-24 PROCEDURE — 77066 DX MAMMO INCL CAD BI: CPT | Mod: TC

## 2020-01-24 PROCEDURE — 76642 ULTRASOUND BREAST LIMITED: CPT | Mod: 26,LT,, | Performed by: RADIOLOGY

## 2020-01-24 PROCEDURE — 77066 DX MAMMO INCL CAD BI: CPT | Mod: 26,,, | Performed by: RADIOLOGY

## 2020-01-24 PROCEDURE — 77066 MAMMO DIGITAL DIAGNOSTIC BILAT WITH TOMOSYNTHESIS_CAD: ICD-10-PCS | Mod: 26,,, | Performed by: RADIOLOGY

## 2020-08-14 DIAGNOSIS — Z11.59 NEED FOR HEPATITIS C SCREENING TEST: ICD-10-CM

## 2021-01-15 ENCOUNTER — LAB VISIT (OUTPATIENT)
Dept: LAB | Facility: HOSPITAL | Age: 41
End: 2021-01-15
Attending: FAMILY MEDICINE
Payer: COMMERCIAL

## 2021-01-15 ENCOUNTER — OFFICE VISIT (OUTPATIENT)
Dept: FAMILY MEDICINE | Facility: CLINIC | Age: 41
End: 2021-01-15
Payer: COMMERCIAL

## 2021-01-15 VITALS
HEIGHT: 64 IN | SYSTOLIC BLOOD PRESSURE: 128 MMHG | OXYGEN SATURATION: 98 % | HEART RATE: 76 BPM | TEMPERATURE: 99 F | DIASTOLIC BLOOD PRESSURE: 80 MMHG | WEIGHT: 147.69 LBS | BODY MASS INDEX: 25.21 KG/M2

## 2021-01-15 DIAGNOSIS — Z12.31 OTHER SCREENING MAMMOGRAM: ICD-10-CM

## 2021-01-15 DIAGNOSIS — Z20.822 CLOSE EXPOSURE TO COVID-19 VIRUS: ICD-10-CM

## 2021-01-15 DIAGNOSIS — Z00.00 ANNUAL PHYSICAL EXAM: ICD-10-CM

## 2021-01-15 DIAGNOSIS — Z00.00 ANNUAL PHYSICAL EXAM: Primary | ICD-10-CM

## 2021-01-15 LAB
ALBUMIN SERPL BCP-MCNC: 4.1 G/DL (ref 3.5–5.2)
ALP SERPL-CCNC: 54 U/L (ref 55–135)
ALT SERPL W/O P-5'-P-CCNC: 22 U/L (ref 10–44)
ANION GAP SERPL CALC-SCNC: 8 MMOL/L (ref 8–16)
AST SERPL-CCNC: 24 U/L (ref 10–40)
BASOPHILS # BLD AUTO: 0.03 K/UL (ref 0–0.2)
BASOPHILS NFR BLD: 0.9 % (ref 0–1.9)
BILIRUB SERPL-MCNC: 0.4 MG/DL (ref 0.1–1)
BUN SERPL-MCNC: 15 MG/DL (ref 6–20)
CALCIUM SERPL-MCNC: 9.1 MG/DL (ref 8.7–10.5)
CHLORIDE SERPL-SCNC: 108 MMOL/L (ref 95–110)
CHOLEST SERPL-MCNC: 201 MG/DL (ref 120–199)
CHOLEST/HDLC SERPL: 2.4 {RATIO} (ref 2–5)
CO2 SERPL-SCNC: 25 MMOL/L (ref 23–29)
CREAT SERPL-MCNC: 0.8 MG/DL (ref 0.5–1.4)
CRP SERPL-MCNC: 0.6 MG/L (ref 0–8.2)
D DIMER PPP IA.FEU-MCNC: 0.39 MG/L FEU
DIFFERENTIAL METHOD: ABNORMAL
EOSINOPHIL # BLD AUTO: 0.1 K/UL (ref 0–0.5)
EOSINOPHIL NFR BLD: 2.3 % (ref 0–8)
ERYTHROCYTE [DISTWIDTH] IN BLOOD BY AUTOMATED COUNT: 13.2 % (ref 11.5–14.5)
ERYTHROCYTE [SEDIMENTATION RATE] IN BLOOD BY WESTERGREN METHOD: 3 MM/HR (ref 0–20)
EST. GFR  (AFRICAN AMERICAN): >60 ML/MIN/1.73 M^2
EST. GFR  (NON AFRICAN AMERICAN): >60 ML/MIN/1.73 M^2
GLUCOSE SERPL-MCNC: 95 MG/DL (ref 70–110)
HCT VFR BLD AUTO: 45.6 % (ref 37–48.5)
HDLC SERPL-MCNC: 83 MG/DL (ref 40–75)
HDLC SERPL: 41.3 % (ref 20–50)
HGB BLD-MCNC: 14.5 G/DL (ref 12–16)
IMM GRANULOCYTES # BLD AUTO: 0.01 K/UL (ref 0–0.04)
IMM GRANULOCYTES NFR BLD AUTO: 0.3 % (ref 0–0.5)
LDLC SERPL CALC-MCNC: 105.2 MG/DL (ref 63–159)
LYMPHOCYTES # BLD AUTO: 1 K/UL (ref 1–4.8)
LYMPHOCYTES NFR BLD: 28.8 % (ref 18–48)
MCH RBC QN AUTO: 30.6 PG (ref 27–31)
MCHC RBC AUTO-ENTMCNC: 31.8 G/DL (ref 32–36)
MCV RBC AUTO: 96 FL (ref 82–98)
MONOCYTES # BLD AUTO: 0.4 K/UL (ref 0.3–1)
MONOCYTES NFR BLD: 10.8 % (ref 4–15)
NEUTROPHILS # BLD AUTO: 2 K/UL (ref 1.8–7.7)
NEUTROPHILS NFR BLD: 56.9 % (ref 38–73)
NONHDLC SERPL-MCNC: 118 MG/DL
NRBC BLD-RTO: 0 /100 WBC
PLATELET # BLD AUTO: 185 K/UL (ref 150–350)
PMV BLD AUTO: 10.7 FL (ref 9.2–12.9)
POTASSIUM SERPL-SCNC: 4.5 MMOL/L (ref 3.5–5.1)
PROT SERPL-MCNC: 7.1 G/DL (ref 6–8.4)
RBC # BLD AUTO: 4.74 M/UL (ref 4–5.4)
SARS-COV-2 IGG SERPLBLD QL IA.RAPID: POSITIVE
SODIUM SERPL-SCNC: 141 MMOL/L (ref 136–145)
TRIGL SERPL-MCNC: 64 MG/DL (ref 30–150)
TSH SERPL DL<=0.005 MIU/L-ACNC: 1.03 UIU/ML (ref 0.4–4)
URATE SERPL-MCNC: 4 MG/DL (ref 2.4–5.7)
WBC # BLD AUTO: 3.51 K/UL (ref 3.9–12.7)

## 2021-01-15 PROCEDURE — 84550 ASSAY OF BLOOD/URIC ACID: CPT

## 2021-01-15 PROCEDURE — 36415 COLL VENOUS BLD VENIPUNCTURE: CPT | Mod: PO

## 2021-01-15 PROCEDURE — 83036 HEMOGLOBIN GLYCOSYLATED A1C: CPT

## 2021-01-15 PROCEDURE — 99999 PR PBB SHADOW E&M-EST. PATIENT-LVL III: CPT | Mod: PBBFAC,,, | Performed by: FAMILY MEDICINE

## 2021-01-15 PROCEDURE — 99396 PR PREVENTIVE VISIT,EST,40-64: ICD-10-PCS | Mod: S$GLB,,, | Performed by: FAMILY MEDICINE

## 2021-01-15 PROCEDURE — 1126F PR PAIN SEVERITY QUANTIFIED, NO PAIN PRESENT: ICD-10-PCS | Mod: S$GLB,,, | Performed by: FAMILY MEDICINE

## 2021-01-15 PROCEDURE — 99396 PREV VISIT EST AGE 40-64: CPT | Mod: S$GLB,,, | Performed by: FAMILY MEDICINE

## 2021-01-15 PROCEDURE — 86140 C-REACTIVE PROTEIN: CPT

## 2021-01-15 PROCEDURE — 1126F AMNT PAIN NOTED NONE PRSNT: CPT | Mod: S$GLB,,, | Performed by: FAMILY MEDICINE

## 2021-01-15 PROCEDURE — 84443 ASSAY THYROID STIM HORMONE: CPT

## 2021-01-15 PROCEDURE — 86703 HIV-1/HIV-2 1 RESULT ANTBDY: CPT

## 2021-01-15 PROCEDURE — 86769 SARS-COV-2 COVID-19 ANTIBODY: CPT

## 2021-01-15 PROCEDURE — 85652 RBC SED RATE AUTOMATED: CPT

## 2021-01-15 PROCEDURE — 80053 COMPREHEN METABOLIC PANEL: CPT

## 2021-01-15 PROCEDURE — 80061 LIPID PANEL: CPT

## 2021-01-15 PROCEDURE — 86803 HEPATITIS C AB TEST: CPT

## 2021-01-15 PROCEDURE — 3008F PR BODY MASS INDEX (BMI) DOCUMENTED: ICD-10-PCS | Mod: CPTII,S$GLB,, | Performed by: FAMILY MEDICINE

## 2021-01-15 PROCEDURE — 85379 FIBRIN DEGRADATION QUANT: CPT

## 2021-01-15 PROCEDURE — 85025 COMPLETE CBC W/AUTO DIFF WBC: CPT

## 2021-01-15 PROCEDURE — 3008F BODY MASS INDEX DOCD: CPT | Mod: CPTII,S$GLB,, | Performed by: FAMILY MEDICINE

## 2021-01-15 PROCEDURE — 99999 PR PBB SHADOW E&M-EST. PATIENT-LVL III: ICD-10-PCS | Mod: PBBFAC,,, | Performed by: FAMILY MEDICINE

## 2021-01-16 LAB
ESTIMATED AVG GLUCOSE: 97 MG/DL (ref 68–131)
HBA1C MFR BLD HPLC: 5 % (ref 4–5.6)

## 2021-01-18 LAB
HCV AB SERPL QL IA: NEGATIVE
HIV 1+2 AB+HIV1 P24 AG SERPL QL IA: NEGATIVE

## 2021-02-12 ENCOUNTER — HOSPITAL ENCOUNTER (OUTPATIENT)
Dept: RADIOLOGY | Facility: HOSPITAL | Age: 41
Discharge: HOME | End: 2021-02-12
Attending: FAMILY MEDICINE
Payer: COMMERCIAL

## 2021-02-12 DIAGNOSIS — Z12.31 OTHER SCREENING MAMMOGRAM: ICD-10-CM

## 2021-02-12 PROCEDURE — 77063 MAMMO DIGITAL SCREENING BILAT WITH TOMO: ICD-10-PCS | Mod: 26,,, | Performed by: RADIOLOGY

## 2021-02-12 PROCEDURE — 77067 MAMMO DIGITAL SCREENING BILAT WITH TOMO: ICD-10-PCS | Mod: 26,,, | Performed by: RADIOLOGY

## 2021-02-12 PROCEDURE — 77067 SCR MAMMO BI INCL CAD: CPT | Mod: TC

## 2021-02-12 PROCEDURE — 77067 SCR MAMMO BI INCL CAD: CPT | Mod: 26,,, | Performed by: RADIOLOGY

## 2021-02-12 PROCEDURE — 77063 BREAST TOMOSYNTHESIS BI: CPT | Mod: 26,,, | Performed by: RADIOLOGY

## 2021-04-28 ENCOUNTER — PATIENT MESSAGE (OUTPATIENT)
Dept: RESEARCH | Facility: HOSPITAL | Age: 41
End: 2021-04-28

## 2021-06-30 ENCOUNTER — OFFICE VISIT (OUTPATIENT)
Dept: OBSTETRICS AND GYNECOLOGY | Facility: CLINIC | Age: 41
End: 2021-06-30
Payer: COMMERCIAL

## 2021-06-30 VITALS
SYSTOLIC BLOOD PRESSURE: 122 MMHG | HEIGHT: 64 IN | BODY MASS INDEX: 23.29 KG/M2 | WEIGHT: 136.44 LBS | DIASTOLIC BLOOD PRESSURE: 80 MMHG

## 2021-06-30 DIAGNOSIS — Z01.419 WELL WOMAN EXAM WITH ROUTINE GYNECOLOGICAL EXAM: Primary | ICD-10-CM

## 2021-06-30 PROCEDURE — 99999 PR PBB SHADOW E&M-EST. PATIENT-LVL III: ICD-10-PCS | Mod: PBBFAC,,, | Performed by: STUDENT IN AN ORGANIZED HEALTH CARE EDUCATION/TRAINING PROGRAM

## 2021-06-30 PROCEDURE — 99999 PR PBB SHADOW E&M-EST. PATIENT-LVL III: CPT | Mod: PBBFAC,,, | Performed by: STUDENT IN AN ORGANIZED HEALTH CARE EDUCATION/TRAINING PROGRAM

## 2021-06-30 PROCEDURE — 1126F AMNT PAIN NOTED NONE PRSNT: CPT | Mod: S$GLB,,, | Performed by: STUDENT IN AN ORGANIZED HEALTH CARE EDUCATION/TRAINING PROGRAM

## 2021-06-30 PROCEDURE — 99386 PREV VISIT NEW AGE 40-64: CPT | Mod: S$GLB,,, | Performed by: STUDENT IN AN ORGANIZED HEALTH CARE EDUCATION/TRAINING PROGRAM

## 2021-06-30 PROCEDURE — 99386 PR PREVENTIVE VISIT,NEW,40-64: ICD-10-PCS | Mod: S$GLB,,, | Performed by: STUDENT IN AN ORGANIZED HEALTH CARE EDUCATION/TRAINING PROGRAM

## 2021-06-30 PROCEDURE — 3008F BODY MASS INDEX DOCD: CPT | Mod: CPTII,S$GLB,, | Performed by: STUDENT IN AN ORGANIZED HEALTH CARE EDUCATION/TRAINING PROGRAM

## 2021-06-30 PROCEDURE — 88175 CYTOPATH C/V AUTO FLUID REDO: CPT | Performed by: STUDENT IN AN ORGANIZED HEALTH CARE EDUCATION/TRAINING PROGRAM

## 2021-06-30 PROCEDURE — 87624 HPV HI-RISK TYP POOLED RSLT: CPT | Performed by: STUDENT IN AN ORGANIZED HEALTH CARE EDUCATION/TRAINING PROGRAM

## 2021-06-30 PROCEDURE — 3008F PR BODY MASS INDEX (BMI) DOCUMENTED: ICD-10-PCS | Mod: CPTII,S$GLB,, | Performed by: STUDENT IN AN ORGANIZED HEALTH CARE EDUCATION/TRAINING PROGRAM

## 2021-06-30 PROCEDURE — 1126F PR PAIN SEVERITY QUANTIFIED, NO PAIN PRESENT: ICD-10-PCS | Mod: S$GLB,,, | Performed by: STUDENT IN AN ORGANIZED HEALTH CARE EDUCATION/TRAINING PROGRAM

## 2021-07-08 LAB
FINAL PATHOLOGIC DIAGNOSIS: NORMAL
HPV HR 12 DNA SPEC QL NAA+PROBE: NEGATIVE
HPV16 AG SPEC QL: NEGATIVE
HPV18 DNA SPEC QL NAA+PROBE: NEGATIVE
Lab: NORMAL

## 2021-07-25 ENCOUNTER — PATIENT MESSAGE (OUTPATIENT)
Dept: FAMILY MEDICINE | Facility: CLINIC | Age: 41
End: 2021-07-25

## 2021-07-25 DIAGNOSIS — R30.0 DYSURIA: Primary | ICD-10-CM

## 2021-07-29 ENCOUNTER — LAB VISIT (OUTPATIENT)
Dept: LAB | Facility: HOSPITAL | Age: 41
End: 2021-07-29
Attending: FAMILY MEDICINE
Payer: COMMERCIAL

## 2021-07-29 DIAGNOSIS — R30.0 DYSURIA: ICD-10-CM

## 2021-07-29 LAB
BILIRUB UR QL STRIP: NEGATIVE
CLARITY UR: ABNORMAL
COLOR UR: YELLOW
GLUCOSE UR QL STRIP: NEGATIVE
HGB UR QL STRIP: NEGATIVE
KETONES UR QL STRIP: NEGATIVE
LEUKOCYTE ESTERASE UR QL STRIP: ABNORMAL
MICROSCOPIC COMMENT: ABNORMAL
NITRITE UR QL STRIP: NEGATIVE
PH UR STRIP: 6 [PH] (ref 5–8)
PROT UR QL STRIP: NEGATIVE
RBC #/AREA URNS HPF: 2 /HPF (ref 0–4)
SP GR UR STRIP: 1.02 (ref 1–1.03)
SQUAMOUS #/AREA URNS HPF: 23 /HPF
URN SPEC COLLECT METH UR: ABNORMAL
UROBILINOGEN UR STRIP-ACNC: NEGATIVE EU/DL
WBC #/AREA URNS HPF: 6 /HPF (ref 0–5)

## 2021-07-29 PROCEDURE — 81000 URINALYSIS NONAUTO W/SCOPE: CPT | Performed by: FAMILY MEDICINE

## 2021-07-30 ENCOUNTER — PATIENT MESSAGE (OUTPATIENT)
Dept: FAMILY MEDICINE | Facility: CLINIC | Age: 41
End: 2021-07-30

## 2021-07-30 DIAGNOSIS — R30.0 DYSURIA: Primary | ICD-10-CM

## 2021-07-30 RX ORDER — CIPROFLOXACIN 500 MG/1
500 TABLET ORAL EVERY 12 HOURS
Qty: 10 TABLET | Refills: 0 | Status: SHIPPED | OUTPATIENT
Start: 2021-07-30 | End: 2021-08-04

## 2021-08-13 ENCOUNTER — PATIENT MESSAGE (OUTPATIENT)
Dept: FAMILY MEDICINE | Facility: CLINIC | Age: 41
End: 2021-08-13

## 2021-08-13 DIAGNOSIS — F41.9 ANXIETY: ICD-10-CM

## 2021-08-13 RX ORDER — BUSPIRONE HYDROCHLORIDE 5 MG/1
5 TABLET ORAL 2 TIMES DAILY PRN
Qty: 60 TABLET | Refills: 0 | Status: SHIPPED | OUTPATIENT
Start: 2021-08-13 | End: 2022-08-13

## 2022-01-21 ENCOUNTER — OFFICE VISIT (OUTPATIENT)
Dept: FAMILY MEDICINE | Facility: CLINIC | Age: 42
End: 2022-01-21
Payer: COMMERCIAL

## 2022-01-21 VITALS
HEIGHT: 65 IN | WEIGHT: 143.5 LBS | DIASTOLIC BLOOD PRESSURE: 89 MMHG | HEART RATE: 87 BPM | SYSTOLIC BLOOD PRESSURE: 126 MMHG | BODY MASS INDEX: 23.91 KG/M2 | RESPIRATION RATE: 14 BRPM

## 2022-01-21 DIAGNOSIS — Z00.00 ANNUAL PHYSICAL EXAM: Primary | ICD-10-CM

## 2022-01-21 DIAGNOSIS — M79.10 MYALGIA: ICD-10-CM

## 2022-01-21 PROCEDURE — 99396 PR PREVENTIVE VISIT,EST,40-64: ICD-10-PCS | Mod: S$GLB,,, | Performed by: FAMILY MEDICINE

## 2022-01-21 PROCEDURE — 3079F DIAST BP 80-89 MM HG: CPT | Mod: CPTII,S$GLB,, | Performed by: FAMILY MEDICINE

## 2022-01-21 PROCEDURE — 3008F PR BODY MASS INDEX (BMI) DOCUMENTED: ICD-10-PCS | Mod: CPTII,S$GLB,, | Performed by: FAMILY MEDICINE

## 2022-01-21 PROCEDURE — 3079F PR MOST RECENT DIASTOLIC BLOOD PRESSURE 80-89 MM HG: ICD-10-PCS | Mod: CPTII,S$GLB,, | Performed by: FAMILY MEDICINE

## 2022-01-21 PROCEDURE — 99999 PR PBB SHADOW E&M-EST. PATIENT-LVL III: CPT | Mod: PBBFAC,,, | Performed by: FAMILY MEDICINE

## 2022-01-21 PROCEDURE — 1159F MED LIST DOCD IN RCRD: CPT | Mod: CPTII,S$GLB,, | Performed by: FAMILY MEDICINE

## 2022-01-21 PROCEDURE — 1159F PR MEDICATION LIST DOCUMENTED IN MEDICAL RECORD: ICD-10-PCS | Mod: CPTII,S$GLB,, | Performed by: FAMILY MEDICINE

## 2022-01-21 PROCEDURE — 99999 PR PBB SHADOW E&M-EST. PATIENT-LVL III: ICD-10-PCS | Mod: PBBFAC,,, | Performed by: FAMILY MEDICINE

## 2022-01-21 PROCEDURE — 99396 PREV VISIT EST AGE 40-64: CPT | Mod: S$GLB,,, | Performed by: FAMILY MEDICINE

## 2022-01-21 PROCEDURE — 1160F RVW MEDS BY RX/DR IN RCRD: CPT | Mod: CPTII,S$GLB,, | Performed by: FAMILY MEDICINE

## 2022-01-21 PROCEDURE — 1160F PR REVIEW ALL MEDS BY PRESCRIBER/CLIN PHARMACIST DOCUMENTED: ICD-10-PCS | Mod: CPTII,S$GLB,, | Performed by: FAMILY MEDICINE

## 2022-01-21 PROCEDURE — 3074F SYST BP LT 130 MM HG: CPT | Mod: CPTII,S$GLB,, | Performed by: FAMILY MEDICINE

## 2022-01-21 PROCEDURE — 3008F BODY MASS INDEX DOCD: CPT | Mod: CPTII,S$GLB,, | Performed by: FAMILY MEDICINE

## 2022-01-21 PROCEDURE — 3074F PR MOST RECENT SYSTOLIC BLOOD PRESSURE < 130 MM HG: ICD-10-PCS | Mod: CPTII,S$GLB,, | Performed by: FAMILY MEDICINE

## 2022-01-21 RX ORDER — TIZANIDINE 4 MG/1
4-16 TABLET ORAL NIGHTLY PRN
Qty: 120 TABLET | Refills: 0 | Status: SHIPPED | OUTPATIENT
Start: 2022-01-21 | End: 2022-02-20

## 2022-01-21 NOTE — PROGRESS NOTES
"Chief Complaint   Patient presents with    Annual Exam       SUBJECTIVE:   41 y.o. female for annual routine checkup.    Current Outpatient Medications   Medication Sig Dispense Refill    busPIRone (BUSPAR) 5 MG Tab Take 1 tablet (5 mg total) by mouth 2 (two) times daily as needed. 60 tablet 0    CALCIUM CARBONATE (CALCIUM 500 ORAL) Take 500 mg by mouth once daily.       cetirizine (ZYRTEC) 10 MG tablet Take 10 mg by mouth once daily.      EPIPEN 2-EWA 0.3 mg/0.3 mL AtIn   0    multivitamin capsule Take 1 capsule by mouth once daily.        tiZANidine (ZANAFLEX) 4 MG tablet Take 1-4 tablets (4-16 mg total) by mouth nightly as needed. 120 tablet 0    vitamin D 1000 units Tab Take 1,000 Units by mouth once daily.       No current facility-administered medications for this visit.     Allergies: Patient has no known allergies.   Patient's last menstrual period was 09/03/2015.    ROS:  Feeling well. No dyspnea or chest pain on exertion.  No abdominal pain, change in bowel habits, black or bloody stools.  No urinary tract symptoms. GYN ROS: no breast pain or new or enlarging lumps on self exam, she complains of headaches and hair thinning . No neurological complaints.    OBJECTIVE:   The patient appears well, alert, oriented x 3, in no distress.  /89   Pulse 87   Resp 14   Ht 5' 5" (1.651 m)   Wt 65.1 kg (143 lb 8.3 oz)   LMP 09/03/2015   BMI 23.88 kg/m²   Wt Readings from Last 5 Encounters:   01/21/22 65.1 kg (143 lb 8.3 oz)   06/30/21 61.9 kg (136 lb 7.4 oz)   01/15/21 67 kg (147 lb 11.3 oz)   01/08/20 63.3 kg (139 lb 8.8 oz)   10/01/19 62.1 kg (136 lb 14.5 oz)       ENT normal.  Neck supple. No adenopathy or thyromegaly. DONOVAN. Lungs are clear, good air entry, no wheezes, rhonchi or rales. S1 and S2 normal, no murmurs, regular rate and rhythm. Abdomen soft without tenderness, guarding, mass or organomegaly. Extremities show no edema, normal peripheral pulses. Neurological is normal, no focal " findings.      BREAST EXAM: deferred    PELVIC EXAM: deferred    ASSESSMENT:   1. Annual physical exam    2. Myalgia          PLAN:   Counseled on age appropriate medical preventative services, including age appropriate cancer screenings, over all nutritional health, need for a consistent exercise regimen and an over all push towards maintaining a vigorous and active lifestyle.  Counseled on age appropriate vaccines and discussed upcoming health care needs based on age/gender.  Spent time with patient counseling on need for a good patient/doctor relationship moving forward.  Discussed use of common OTC medications and supplements.  Discussed common dietary aids and use of caffeine and the need for good sleep hygiene and stress management.    Problem List Items Addressed This Visit    None     Visit Diagnoses     Annual physical exam    -  Primary    Relevant Orders    CBC Auto Differential    Comprehensive Metabolic Panel    Hemoglobin A1C    Lipid Panel    Urinalysis    Uric Acid    TSH    D dimer, quantitative    NATALIE Screen w/Reflex    Magnesium    Myalgia        Relevant Medications    tiZANidine (ZANAFLEX) 4 MG tablet        Potential medication side effects were discussed with the patient; let me know if any occur.  Look for covid long hauler  F/u in 1 year for wellness

## 2022-01-24 ENCOUNTER — PATIENT MESSAGE (OUTPATIENT)
Dept: FAMILY MEDICINE | Facility: CLINIC | Age: 42
End: 2022-01-24
Payer: COMMERCIAL

## 2022-02-21 ENCOUNTER — PATIENT MESSAGE (OUTPATIENT)
Dept: FAMILY MEDICINE | Facility: CLINIC | Age: 42
End: 2022-02-21
Payer: COMMERCIAL

## 2022-02-28 ENCOUNTER — TELEPHONE (OUTPATIENT)
Dept: FAMILY MEDICINE | Facility: CLINIC | Age: 42
End: 2022-02-28
Payer: COMMERCIAL

## 2022-03-11 ENCOUNTER — TELEPHONE (OUTPATIENT)
Dept: OBSTETRICS AND GYNECOLOGY | Facility: CLINIC | Age: 42
End: 2022-03-11
Payer: COMMERCIAL

## 2022-03-11 ENCOUNTER — PATIENT MESSAGE (OUTPATIENT)
Dept: OBSTETRICS AND GYNECOLOGY | Facility: CLINIC | Age: 42
End: 2022-03-11
Payer: COMMERCIAL

## 2022-03-11 DIAGNOSIS — Z12.31 ENCOUNTER FOR SCREENING MAMMOGRAM FOR BREAST CANCER: Primary | ICD-10-CM

## 2022-05-26 ENCOUNTER — PATIENT MESSAGE (OUTPATIENT)
Dept: OBSTETRICS AND GYNECOLOGY | Facility: CLINIC | Age: 42
End: 2022-05-26
Payer: COMMERCIAL

## 2022-05-26 DIAGNOSIS — Z12.31 BREAST CANCER SCREENING BY MAMMOGRAM: Primary | ICD-10-CM

## 2022-06-15 ENCOUNTER — PATIENT MESSAGE (OUTPATIENT)
Dept: OBSTETRICS AND GYNECOLOGY | Facility: CLINIC | Age: 42
End: 2022-06-15
Payer: COMMERCIAL

## 2022-06-28 ENCOUNTER — TELEPHONE (OUTPATIENT)
Dept: OBSTETRICS AND GYNECOLOGY | Facility: CLINIC | Age: 42
End: 2022-06-28
Payer: COMMERCIAL